# Patient Record
Sex: FEMALE | Race: OTHER | Employment: FULL TIME | URBAN - METROPOLITAN AREA
[De-identification: names, ages, dates, MRNs, and addresses within clinical notes are randomized per-mention and may not be internally consistent; named-entity substitution may affect disease eponyms.]

---

## 2017-03-03 ENCOUNTER — OFFICE VISIT (OUTPATIENT)
Dept: FAMILY MEDICINE CLINIC | Facility: CLINIC | Age: 52
End: 2017-03-03

## 2017-03-03 VITALS
WEIGHT: 148 LBS | BODY MASS INDEX: 27.94 KG/M2 | DIASTOLIC BLOOD PRESSURE: 84 MMHG | HEART RATE: 72 BPM | HEIGHT: 61 IN | TEMPERATURE: 98 F | SYSTOLIC BLOOD PRESSURE: 155 MMHG

## 2017-03-03 DIAGNOSIS — S29.012A RHOMBOID MUSCLE STRAIN, INITIAL ENCOUNTER: Primary | ICD-10-CM

## 2017-03-03 DIAGNOSIS — M62.830 BACK MUSCLE SPASM: ICD-10-CM

## 2017-03-03 DIAGNOSIS — I10 ESSENTIAL HYPERTENSION: ICD-10-CM

## 2017-03-03 DIAGNOSIS — M79.602 PAIN IN LEFT ARM: ICD-10-CM

## 2017-03-03 PROCEDURE — 99214 OFFICE O/P EST MOD 30 MIN: CPT | Performed by: FAMILY MEDICINE

## 2017-03-03 PROCEDURE — 99212 OFFICE O/P EST SF 10 MIN: CPT | Performed by: FAMILY MEDICINE

## 2017-03-03 RX ORDER — DICLOFENAC SODIUM 75 MG/1
75 TABLET, DELAYED RELEASE ORAL 2 TIMES DAILY
Qty: 42 TABLET | Refills: 0 | Status: SHIPPED | OUTPATIENT
Start: 2017-03-03 | End: 2017-03-24

## 2017-03-03 RX ORDER — LISINOPRIL 10 MG/1
10 TABLET ORAL
Qty: 90 TABLET | Refills: 1 | Status: SHIPPED | OUTPATIENT
Start: 2017-03-03 | End: 2017-07-28 | Stop reason: DRUGHIGH

## 2017-03-03 RX ORDER — CYCLOBENZAPRINE HCL 10 MG
10 TABLET ORAL NIGHTLY
Qty: 20 TABLET | Refills: 0 | Status: SHIPPED | OUTPATIENT
Start: 2017-03-03 | End: 2017-03-23

## 2017-03-03 NOTE — PROGRESS NOTES
Patient ID: Carole Johnston is a 46year old female. HPI  Patient presents with:  Shoulder Pain: left     She works at [employer] and has to scan quite a bit of boxes.   She states she is right-hand dominant for 3 weeks now she has been having pain by her left sca reflexes. No cranial nerve deficit or sensory deficit. Coordination and gait normal.   Skin: Skin is warm and dry. Psychiatric: Patient has a normal mood and affect. Vitals reviewed.     Empty can testing for supraspinatus testing of the rotator cuff is discussed and patient/family member understands and agrees to plan. Return in about 1 month (around 4/3/2017).         Torie Schwartz,   3/3/2017

## 2017-03-14 ENCOUNTER — OFFICE VISIT (OUTPATIENT)
Dept: FAMILY MEDICINE CLINIC | Facility: CLINIC | Age: 52
End: 2017-03-14

## 2017-03-14 VITALS
TEMPERATURE: 101 F | HEART RATE: 85 BPM | DIASTOLIC BLOOD PRESSURE: 78 MMHG | SYSTOLIC BLOOD PRESSURE: 148 MMHG | BODY MASS INDEX: 27.94 KG/M2 | HEIGHT: 61 IN | WEIGHT: 148 LBS

## 2017-03-14 DIAGNOSIS — M79.10 MYALGIA: ICD-10-CM

## 2017-03-14 DIAGNOSIS — R50.9 FEVER, UNSPECIFIED FEVER CAUSE: ICD-10-CM

## 2017-03-14 DIAGNOSIS — H10.9 CONJUNCTIVITIS OF BOTH EYES, UNSPECIFIED CONJUNCTIVITIS TYPE: Primary | ICD-10-CM

## 2017-03-14 PROCEDURE — 99214 OFFICE O/P EST MOD 30 MIN: CPT | Performed by: FAMILY MEDICINE

## 2017-03-14 PROCEDURE — 99212 OFFICE O/P EST SF 10 MIN: CPT | Performed by: FAMILY MEDICINE

## 2017-03-14 RX ORDER — AZELASTINE HYDROCHLORIDE 0.5 MG/ML
1 SOLUTION/ DROPS OPHTHALMIC 2 TIMES DAILY
Qty: 1 BOTTLE | Refills: 0 | Status: SHIPPED | OUTPATIENT
Start: 2017-03-14 | End: 2017-07-12

## 2017-03-14 RX ORDER — OSELTAMIVIR PHOSPHATE 75 MG/1
75 CAPSULE ORAL 2 TIMES DAILY
Qty: 10 CAPSULE | Refills: 0 | Status: SHIPPED | OUTPATIENT
Start: 2017-03-14 | End: 2017-03-19

## 2017-03-14 RX ORDER — IBUPROFEN 600 MG/1
600 TABLET ORAL EVERY 8 HOURS PRN
Qty: 30 TABLET | Refills: 0 | Status: SHIPPED | OUTPATIENT
Start: 2017-03-14 | End: 2017-03-24

## 2017-03-14 NOTE — PROGRESS NOTES
Patient ID: Carmen Lozoya is a 46year old female.     HPI  Patient presents with:  Eye Problem: redness, itchy   Fatigue    She states yesterday morning her left eye felt a little crusty but this morning was the right eye as well as left eye and she awoke w rhinorrhea. THROAT: Oropharynx is clear without exudate   Eyes: Extraocular muscles are intact. Conjunctiva are slightly red. Eyelids are slightly puffy anteriorly but no purulent discharge. Neck: Normal range of motion. No thyromegaly present.    Card DO  3/14/2017

## 2017-03-16 ENCOUNTER — TELEPHONE (OUTPATIENT)
Dept: FAMILY MEDICINE CLINIC | Facility: CLINIC | Age: 52
End: 2017-03-16

## 2017-03-16 NOTE — TELEPHONE ENCOUNTER
----- Message from Diana Mota DO sent at 3/15/2017  6:09 PM CDT -----  Nasal swab was completely negative. Hopefully you are feeling better. If you are you can really stop the Tamiflu as the influenza test was negative.

## 2017-04-03 ENCOUNTER — OFFICE VISIT (OUTPATIENT)
Dept: FAMILY MEDICINE CLINIC | Facility: CLINIC | Age: 52
End: 2017-04-03

## 2017-04-03 VITALS
SYSTOLIC BLOOD PRESSURE: 142 MMHG | WEIGHT: 148 LBS | BODY MASS INDEX: 28 KG/M2 | TEMPERATURE: 99 F | DIASTOLIC BLOOD PRESSURE: 84 MMHG | HEART RATE: 76 BPM

## 2017-04-03 DIAGNOSIS — J01.80 OTHER ACUTE SINUSITIS, RECURRENCE NOT SPECIFIED: Primary | ICD-10-CM

## 2017-04-03 PROCEDURE — 99213 OFFICE O/P EST LOW 20 MIN: CPT | Performed by: FAMILY MEDICINE

## 2017-04-03 PROCEDURE — 99212 OFFICE O/P EST SF 10 MIN: CPT | Performed by: FAMILY MEDICINE

## 2017-04-03 RX ORDER — AMOXICILLIN 875 MG/1
875 TABLET, COATED ORAL 2 TIMES DAILY
Qty: 20 TABLET | Refills: 0 | Status: SHIPPED | OUTPATIENT
Start: 2017-04-03 | End: 2017-04-13

## 2017-04-03 RX ORDER — FLUTICASONE PROPIONATE 50 MCG
2 SPRAY, SUSPENSION (ML) NASAL NIGHTLY
Qty: 1 BOTTLE | Refills: 1 | Status: SHIPPED | OUTPATIENT
Start: 2017-04-03 | End: 2017-07-28

## 2017-04-03 NOTE — PROGRESS NOTES
HPI:   Raynell Gaucher is a 46year old female who presents for upper respiratory symptoms for  2  weeks. Patient reports congestion, cough with yellow colored sputum, cough is keeping pt up at night.     Seen 3/14 and presumed flu and prescribed tamiflu  Flu t apparent distress  SKIN: no rashes,no suspicious lesions  EYES:PERRLA, EOMI, conjunctiva are clear  HEENT: atraumatic, normocephalic,ears and throat are clear  NECK: supple,no adenopathy,no bruits  LUNGS: clear to auscultation  CARDIO: RRR without murmur

## 2017-07-28 ENCOUNTER — OFFICE VISIT (OUTPATIENT)
Dept: FAMILY MEDICINE CLINIC | Facility: CLINIC | Age: 52
End: 2017-07-28

## 2017-07-28 VITALS
SYSTOLIC BLOOD PRESSURE: 148 MMHG | BODY MASS INDEX: 27.38 KG/M2 | DIASTOLIC BLOOD PRESSURE: 82 MMHG | TEMPERATURE: 99 F | HEIGHT: 61 IN | WEIGHT: 145 LBS | HEART RATE: 74 BPM

## 2017-07-28 DIAGNOSIS — I10 ESSENTIAL HYPERTENSION: ICD-10-CM

## 2017-07-28 DIAGNOSIS — J30.2 ACUTE SEASONAL ALLERGIC RHINITIS, UNSPECIFIED TRIGGER: ICD-10-CM

## 2017-07-28 DIAGNOSIS — Z00.00 WELL ADULT EXAM: Primary | ICD-10-CM

## 2017-07-28 DIAGNOSIS — Z12.31 SCREENING MAMMOGRAM, ENCOUNTER FOR: ICD-10-CM

## 2017-07-28 PROCEDURE — 99396 PREV VISIT EST AGE 40-64: CPT | Performed by: NURSE PRACTITIONER

## 2017-07-28 RX ORDER — LISINOPRIL AND HYDROCHLOROTHIAZIDE 20; 12.5 MG/1; MG/1
1 TABLET ORAL DAILY
Qty: 90 TABLET | Refills: 1 | Status: SHIPPED | OUTPATIENT
Start: 2017-07-28 | End: 2017-08-28

## 2017-07-28 RX ORDER — FLUTICASONE PROPIONATE 50 MCG
2 SPRAY, SUSPENSION (ML) NASAL DAILY
Qty: 3 BOTTLE | Refills: 3 | Status: SHIPPED | OUTPATIENT
Start: 2017-07-28 | End: 2018-07-23

## 2017-07-28 NOTE — PROGRESS NOTES
Pt presents for f/u on bp-has been elevated when checking at home 140-150's/80-90's; uses wrist cuff. Has had cold s/s for past couple of days. Thought she was in menopause but got period last month after 10 months of no period-feels a little on edge. inhibitors. The current treatment provides mild improvement. There are no compliance problems. Review of Systems   Constitutional: Positive for malaise/fatigue. Negative for fever.    HENT: Positive for congestion, rhinorrhea, sinus pain, sinus pre on file     Other Topics Concern    Caffeine Concern Yes    Comment: Coffee, 1 cup daily     Social History Narrative   None on file         Current Outpatient Prescriptions:  Fluticasone Propionate 50 MCG/ACT Nasal Suspension 2 sprays by Each Nare route d 4. Screening mammogram, encounter for        Plan:     1. Routine labs ordered  2. Stop lisinopril 10mg ; start lisinopril hctz 20/12.5 I po q d  bp check in 1 week  3. otc antihistamine, flonase  Supportive care discussed  4.  Screening mammogram ordered

## 2017-08-12 ENCOUNTER — APPOINTMENT (OUTPATIENT)
Dept: LAB | Age: 52
End: 2017-08-12
Attending: NURSE PRACTITIONER
Payer: COMMERCIAL

## 2017-08-12 DIAGNOSIS — I10 ESSENTIAL HYPERTENSION: ICD-10-CM

## 2017-08-12 DIAGNOSIS — Z00.00 WELL ADULT EXAM: ICD-10-CM

## 2017-08-12 LAB
ALBUMIN SERPL BCP-MCNC: 3.9 G/DL (ref 3.5–4.8)
ALBUMIN/GLOB SERPL: 1.2 {RATIO} (ref 1–2)
ALP SERPL-CCNC: 69 U/L (ref 32–100)
ALT SERPL-CCNC: 37 U/L (ref 14–54)
ANION GAP SERPL CALC-SCNC: 7 MMOL/L (ref 0–18)
AST SERPL-CCNC: 36 U/L (ref 15–41)
BILIRUB SERPL-MCNC: 0.7 MG/DL (ref 0.3–1.2)
BILIRUB UR QL: NEGATIVE
BUN SERPL-MCNC: 8 MG/DL (ref 8–20)
BUN/CREAT SERPL: 11.4 (ref 10–20)
CALCIUM SERPL-MCNC: 9.5 MG/DL (ref 8.5–10.5)
CHLORIDE SERPL-SCNC: 105 MMOL/L (ref 95–110)
CHOLEST SERPL-MCNC: 245 MG/DL (ref 110–200)
CO2 SERPL-SCNC: 29 MMOL/L (ref 22–32)
COLOR UR: YELLOW
CREAT SERPL-MCNC: 0.7 MG/DL (ref 0.5–1.5)
ERYTHROCYTE [DISTWIDTH] IN BLOOD BY AUTOMATED COUNT: 13.2 % (ref 11–15)
GLOBULIN PLAS-MCNC: 3.3 G/DL (ref 2.5–3.7)
GLUCOSE SERPL-MCNC: 93 MG/DL (ref 70–99)
GLUCOSE UR-MCNC: NEGATIVE MG/DL
HCT VFR BLD AUTO: 40.9 % (ref 35–48)
HDLC SERPL-MCNC: 52 MG/DL
HGB BLD-MCNC: 13.9 G/DL (ref 12–16)
HGB UR QL STRIP.AUTO: NEGATIVE
HYALINE CASTS #/AREA URNS AUTO: 1 /LPF
KETONES UR-MCNC: NEGATIVE MG/DL
LDLC SERPL CALC-MCNC: 161 MG/DL (ref 0–99)
MCH RBC QN AUTO: 31.1 PG (ref 27–32)
MCHC RBC AUTO-ENTMCNC: 34.1 G/DL (ref 32–37)
MCV RBC AUTO: 91.4 FL (ref 80–100)
NITRITE UR QL STRIP.AUTO: NEGATIVE
NONHDLC SERPL-MCNC: 193 MG/DL
OSMOLALITY UR CALC.SUM OF ELEC: 290 MOSM/KG (ref 275–295)
PH UR: 7 [PH] (ref 5–8)
PLATELET # BLD AUTO: 324 K/UL (ref 140–400)
PMV BLD AUTO: 9 FL (ref 7.4–10.3)
POTASSIUM SERPL-SCNC: 4.4 MMOL/L (ref 3.3–5.1)
PROT SERPL-MCNC: 7.2 G/DL (ref 5.9–8.4)
PROT UR-MCNC: NEGATIVE MG/DL
RBC # BLD AUTO: 4.47 M/UL (ref 3.7–5.4)
RBC #/AREA URNS AUTO: 1 /HPF
SODIUM SERPL-SCNC: 141 MMOL/L (ref 136–144)
SP GR UR STRIP: 1.01 (ref 1–1.03)
TRIGL SERPL-MCNC: 161 MG/DL (ref 1–149)
TSH SERPL-ACNC: 2.16 UIU/ML (ref 0.45–5.33)
UROBILINOGEN UR STRIP-ACNC: <2
VIT B12 SERPL-MCNC: 670 PG/ML (ref 181–914)
VIT C UR-MCNC: NEGATIVE MG/DL
WBC # BLD AUTO: 7.4 K/UL (ref 4–11)
WBC #/AREA URNS AUTO: 1 /HPF

## 2017-08-12 PROCEDURE — 81001 URINALYSIS AUTO W/SCOPE: CPT

## 2017-08-12 PROCEDURE — 80053 COMPREHEN METABOLIC PANEL: CPT

## 2017-08-12 PROCEDURE — 84443 ASSAY THYROID STIM HORMONE: CPT

## 2017-08-12 PROCEDURE — 82607 VITAMIN B-12: CPT

## 2017-08-12 PROCEDURE — 80061 LIPID PANEL: CPT

## 2017-08-12 PROCEDURE — 82306 VITAMIN D 25 HYDROXY: CPT

## 2017-08-12 PROCEDURE — 36415 COLL VENOUS BLD VENIPUNCTURE: CPT

## 2017-08-12 PROCEDURE — 83036 HEMOGLOBIN GLYCOSYLATED A1C: CPT

## 2017-08-12 PROCEDURE — 85027 COMPLETE CBC AUTOMATED: CPT

## 2017-08-13 LAB — HBA1C MFR BLD: 5.8 % (ref 4–6)

## 2017-08-14 DIAGNOSIS — E78.2 MIXED HYPERLIPIDEMIA: Primary | ICD-10-CM

## 2017-08-14 LAB — 25(OH)D3 SERPL-MCNC: 32.5 NG/ML

## 2017-08-14 RX ORDER — ATORVASTATIN CALCIUM 10 MG/1
10 TABLET, FILM COATED ORAL DAILY
Qty: 90 TABLET | Refills: 3 | Status: SHIPPED | OUTPATIENT
Start: 2017-08-14 | End: 2017-10-24

## 2017-08-18 ENCOUNTER — TELEPHONE (OUTPATIENT)
Dept: FAMILY MEDICINE CLINIC | Facility: CLINIC | Age: 52
End: 2017-08-18

## 2017-08-18 NOTE — TELEPHONE ENCOUNTER
----- Message from KATTY Oreilly FNP-C sent at 8/14/2017  9:26 PM CDT -----  Your lab results are essentially normal except for your cholesterol and triglycerides.   You will need to start taking a cholesterol and triglyceride lowering medication

## 2017-08-19 NOTE — TELEPHONE ENCOUNTER
Spoke with patient (identified name and ), results reviewed and agrees with plan.   Mailed chol diet

## 2017-08-28 ENCOUNTER — OFFICE VISIT (OUTPATIENT)
Dept: FAMILY MEDICINE CLINIC | Facility: CLINIC | Age: 52
End: 2017-08-28

## 2017-08-28 VITALS
SYSTOLIC BLOOD PRESSURE: 121 MMHG | HEIGHT: 61 IN | TEMPERATURE: 98 F | BODY MASS INDEX: 27.56 KG/M2 | WEIGHT: 146 LBS | HEART RATE: 60 BPM | DIASTOLIC BLOOD PRESSURE: 73 MMHG

## 2017-08-28 DIAGNOSIS — E78.00 HYPERCHOLESTEREMIA: ICD-10-CM

## 2017-08-28 DIAGNOSIS — I10 ESSENTIAL HYPERTENSION: Primary | ICD-10-CM

## 2017-08-28 PROCEDURE — 99214 OFFICE O/P EST MOD 30 MIN: CPT | Performed by: FAMILY MEDICINE

## 2017-08-28 PROCEDURE — 99212 OFFICE O/P EST SF 10 MIN: CPT | Performed by: FAMILY MEDICINE

## 2017-08-28 RX ORDER — LISINOPRIL 10 MG/1
10 TABLET ORAL
Qty: 90 TABLET | Refills: 1 | Status: SHIPPED | OUTPATIENT
Start: 2017-08-28 | End: 2017-10-04

## 2017-08-28 NOTE — PATIENT INSTRUCTIONS
Lifestyle Changes to Control Cholesterol  You can control your cholesterol through diet, exercise, weight management, quitting smoking, stress management, and taking your medicines right. These things can also lower your risk for cardiovascular disease. · Riding a bicycle or stationary bike  · Dancing  Managing your weight  If you are overweight or obese, your healthcare provider will work with you to help you lose weight and lower your BMI (body mass index).  Making diet changes and getting more physical · Don’t skip a dose or stop taking your medicine because you feel better or because your cholesterol numbers go down. Never stop taking your medicine unless your healthcare provider has told you it’s OK.   · Ask your healthcare provider if you have any ques © 4152-8461 [address], [address] [place]. All rights reserved. This information is not intended as a substitute for professional medical care. Always follow your healthcare professional's instructions.

## 2017-08-28 NOTE — PROGRESS NOTES
Gregorio Velázquez is a 46year old female. HPI:   Patient presents for recheck of her hypertension. Pt has been taking medications as instructed, no medication side effects    Patient just had a physical recently with our NP.     bp was high that day and so b Diagnosis Date   • Anxiety state, unspecified    • Screening for lipoid disorders 3/7/2015    lipid panel   • Unspecified essential hypertension       Past Surgical History:  No date:   No date:       Comment: 4   Social History:    Smoking

## 2017-08-30 ENCOUNTER — TELEPHONE (OUTPATIENT)
Dept: FAMILY MEDICINE CLINIC | Facility: CLINIC | Age: 52
End: 2017-08-30

## 2017-08-30 NOTE — TELEPHONE ENCOUNTER
Reviewed 8/28/17 progress note-   ASSESSMENT AND PLAN:   Pt presents for a recheck of her hypertension. BP is well controlled, no significant medication side effects noted. PLAN: will continue present medications, low fat low chol diet.  The patient indica

## 2017-08-30 NOTE — TELEPHONE ENCOUNTER
Per pharmacy received lisinopril 10 MG Oral Tab and pt has prescription lisinopril 20/12.5 MG Oral Tab from [facility]ulo last 07/28/2017 would like to know which one to issue.

## 2017-09-22 DIAGNOSIS — I10 ESSENTIAL HYPERTENSION: ICD-10-CM

## 2017-09-26 RX ORDER — LISINOPRIL 10 MG/1
TABLET ORAL
Qty: 90 TABLET | Refills: 0 | Status: SHIPPED | OUTPATIENT
Start: 2017-09-26 | End: 2017-10-04

## 2017-10-04 ENCOUNTER — OFFICE VISIT (OUTPATIENT)
Dept: FAMILY MEDICINE CLINIC | Facility: CLINIC | Age: 52
End: 2017-10-04

## 2017-10-04 VITALS
HEIGHT: 61 IN | TEMPERATURE: 98 F | BODY MASS INDEX: 27.75 KG/M2 | WEIGHT: 147 LBS | DIASTOLIC BLOOD PRESSURE: 74 MMHG | HEART RATE: 58 BPM | SYSTOLIC BLOOD PRESSURE: 138 MMHG

## 2017-10-04 DIAGNOSIS — N95.1 PERIMENOPAUSAL: Primary | ICD-10-CM

## 2017-10-04 DIAGNOSIS — N92.6 IRREGULAR MENSES: ICD-10-CM

## 2017-10-04 DIAGNOSIS — Z23 NEED FOR INFLUENZA VACCINATION: ICD-10-CM

## 2017-10-04 DIAGNOSIS — R20.2 PARESTHESIAS: ICD-10-CM

## 2017-10-04 DIAGNOSIS — R53.83 FATIGUE, UNSPECIFIED TYPE: ICD-10-CM

## 2017-10-04 PROCEDURE — 90686 IIV4 VACC NO PRSV 0.5 ML IM: CPT | Performed by: FAMILY MEDICINE

## 2017-10-04 PROCEDURE — 99214 OFFICE O/P EST MOD 30 MIN: CPT | Performed by: FAMILY MEDICINE

## 2017-10-04 PROCEDURE — 99212 OFFICE O/P EST SF 10 MIN: CPT | Performed by: FAMILY MEDICINE

## 2017-10-04 PROCEDURE — 90471 IMMUNIZATION ADMIN: CPT | Performed by: FAMILY MEDICINE

## 2017-10-04 RX ORDER — LISINOPRIL AND HYDROCHLOROTHIAZIDE 20; 12.5 MG/1; MG/1
TABLET ORAL
Refills: 1 | COMMUNITY
Start: 2017-07-28 | End: 2018-02-06 | Stop reason: DRUGHIGH

## 2017-10-05 NOTE — PROGRESS NOTES
HPI:    Patient ID: Vanessa Morse is a 46year old female.     HPI     Patient presents with:  Fatigue: Pt c/o weakness, fatigue x 3 weeks  Numbness: Pt c/o numbness in foot, bilateral    Feels numbness more in left but sometimes right  Feels numbness heels She displays normal reflexes. No cranial nerve deficit. She exhibits normal muscle tone. Coordination normal.   Skin: Skin is warm and dry. No rash noted.               ASSESSMENT/PLAN:   Perimenopausal  (primary encounter diagnosis)  Irregular menses  Fati

## 2017-10-06 ENCOUNTER — LAB ENCOUNTER (OUTPATIENT)
Dept: LAB | Age: 52
End: 2017-10-06
Attending: FAMILY MEDICINE
Payer: COMMERCIAL

## 2017-10-06 DIAGNOSIS — N92.6 IRREGULAR MENSES: ICD-10-CM

## 2017-10-06 DIAGNOSIS — R20.2 PARESTHESIAS: ICD-10-CM

## 2017-10-06 DIAGNOSIS — R53.83 FATIGUE, UNSPECIFIED TYPE: ICD-10-CM

## 2017-10-06 PROCEDURE — 83001 ASSAY OF GONADOTROPIN (FSH): CPT

## 2017-10-06 PROCEDURE — 80053 COMPREHEN METABOLIC PANEL: CPT

## 2017-10-06 PROCEDURE — 82670 ASSAY OF TOTAL ESTRADIOL: CPT

## 2017-10-06 PROCEDURE — 36415 COLL VENOUS BLD VENIPUNCTURE: CPT

## 2017-10-06 PROCEDURE — 85025 COMPLETE CBC W/AUTO DIFF WBC: CPT

## 2017-10-06 PROCEDURE — 83002 ASSAY OF GONADOTROPIN (LH): CPT

## 2017-10-10 ENCOUNTER — TELEPHONE (OUTPATIENT)
Dept: FAMILY MEDICINE CLINIC | Facility: CLINIC | Age: 52
End: 2017-10-10

## 2017-10-10 NOTE — TELEPHONE ENCOUNTER
----- Message from Fish Romero RN sent at 10/10/2017  8:32 AM CDT -----      ----- Message -----  From: Matti Caceres RN  Sent: 10/10/2017   8:28 AM  To: Destiney Whitmore Triage        ----- Message -----  From: Debbie Valdez MD  Sent: 10/9/2017   9:28 PM  To:  Destiney

## 2017-10-10 NOTE — TELEPHONE ENCOUNTER
Patient called back and advised about Dr Zay Choi note. Patient will check with her insurance about the U/S coverage. U/S already generated. Patient verbalized understanding.

## 2017-10-24 ENCOUNTER — OFFICE VISIT (OUTPATIENT)
Dept: FAMILY MEDICINE CLINIC | Facility: CLINIC | Age: 52
End: 2017-10-24

## 2017-10-24 VITALS
HEIGHT: 61 IN | HEART RATE: 60 BPM | TEMPERATURE: 98 F | WEIGHT: 145 LBS | DIASTOLIC BLOOD PRESSURE: 73 MMHG | BODY MASS INDEX: 27.38 KG/M2 | SYSTOLIC BLOOD PRESSURE: 133 MMHG

## 2017-10-24 DIAGNOSIS — R53.1 WEAKNESS GENERALIZED: ICD-10-CM

## 2017-10-24 DIAGNOSIS — F41.9 ANXIETY AND DEPRESSION: ICD-10-CM

## 2017-10-24 DIAGNOSIS — R42 DIZZINESS: Primary | ICD-10-CM

## 2017-10-24 DIAGNOSIS — R51.9 LEFT TEMPORAL HEADACHE: ICD-10-CM

## 2017-10-24 DIAGNOSIS — F32.A ANXIETY AND DEPRESSION: ICD-10-CM

## 2017-10-24 PROCEDURE — 99214 OFFICE O/P EST MOD 30 MIN: CPT | Performed by: FAMILY MEDICINE

## 2017-10-24 PROCEDURE — 99212 OFFICE O/P EST SF 10 MIN: CPT | Performed by: FAMILY MEDICINE

## 2017-10-24 RX ORDER — ESCITALOPRAM OXALATE 10 MG/1
10 TABLET ORAL
Qty: 90 TABLET | Refills: 0 | Status: SHIPPED | OUTPATIENT
Start: 2017-10-24 | End: 2018-02-06

## 2017-10-24 RX ORDER — ATORVASTATIN CALCIUM 20 MG/1
20 TABLET, FILM COATED ORAL NIGHTLY
Refills: 0 | COMMUNITY
Start: 2017-10-21 | End: 2017-12-22

## 2017-10-24 NOTE — PROGRESS NOTES
HPI:    Patient ID: Raynell Gaucher is a 46year old female. HPI     Patient here for follow up hospitalization  Alesha Silverman to [facility] for her sons graduation, on 10/18/17    Felt very anxious on the plane. Calmed herself down    Then got off plane.  Went to eat Prescriptions:  atorvastatin 20 MG Oral Tab  Disp:  Rfl: 0   escitalopram 10 MG Oral Tab Take 1 tablet (10 mg total) by mouth once daily.  Disp: 90 tablet Rfl: 0   Lisinopril-Hydrochlorothiazide 20-12.5 MG Oral Tab TK 1 T PO D Disp:  Rfl: 1   aspirin 81 MG daily.           Imaging & Referrals:  NEURO - INTERNAL       QG#[id number]

## 2017-10-25 ENCOUNTER — OFFICE VISIT (OUTPATIENT)
Dept: NEUROLOGY | Facility: CLINIC | Age: 52
End: 2017-10-25

## 2017-10-25 ENCOUNTER — TELEPHONE (OUTPATIENT)
Dept: NEUROLOGY | Facility: CLINIC | Age: 52
End: 2017-10-25

## 2017-10-25 VITALS
DIASTOLIC BLOOD PRESSURE: 70 MMHG | WEIGHT: 146 LBS | BODY MASS INDEX: 27.56 KG/M2 | SYSTOLIC BLOOD PRESSURE: 122 MMHG | HEIGHT: 61 IN | HEART RATE: 54 BPM | RESPIRATION RATE: 13 BRPM

## 2017-10-25 DIAGNOSIS — R55 SYNCOPE, UNSPECIFIED SYNCOPE TYPE: Primary | ICD-10-CM

## 2017-10-25 DIAGNOSIS — G44.209 TENSION-TYPE HEADACHE, NOT INTRACTABLE, UNSPECIFIED CHRONICITY PATTERN: ICD-10-CM

## 2017-10-25 PROCEDURE — 99204 OFFICE O/P NEW MOD 45 MIN: CPT | Performed by: OTHER

## 2017-10-25 NOTE — PROGRESS NOTES
Neurology Initial Visit     Referred By: Dr. Morse ref. provider found    Chief Complaint: Patient presents with:  Neurologic Problem: new right handed patient here after episode of passing out on 10/18/17.  pt had lightheadedness, sob, sweating, nausea, vis disorders 3/7/2015    lipid panel   • Unspecified essential hypertension        Past Surgical History:  No date:   No date:       Comment: 4    Social history:    Smoking status: Never Smoker   Smokeless tobacco: Never Used       Alcohol use N confrontation        Fundoscopically- No papilledema or retinal hemorrhages. Normal optic discs, sharp edges. III, IV, VI- EOM intact, MARÍA  V. Facial sensation intact  VII. Face symmetric, no facial weakness  VIII.  Hearing intact to whisper, tuning fork prevention. 2. Tension-type headache, not intractable, unspecified chronicity pattern  New onset headache. It is relatively mild.   So far is not exhibiting any signs of structural cause, MRI of the brain was already done and that was normal.  Therefore

## 2017-10-25 NOTE — TELEPHONE ENCOUNTER
Called [facility] for authorization of approval of acupuncture cpt codes [id number]. Talked to [address]. who states no authorization is required. Reference #   [id number]. Will call Pt. to inform. Pt. informed no authorization is required.  She  will [facility]

## 2017-11-04 ENCOUNTER — HOSPITAL ENCOUNTER (OUTPATIENT)
Dept: ELECTROPHYSIOLOGY | Facility: HOSPITAL | Age: 52
Discharge: HOME | End: 2017-11-04
Attending: Other
Payer: COMMERCIAL

## 2017-11-04 DIAGNOSIS — R55 SYNCOPE, UNSPECIFIED SYNCOPE TYPE: ICD-10-CM

## 2017-11-04 PROCEDURE — 95816 EEG AWAKE AND DROWSY: CPT | Performed by: OTHER

## 2017-11-06 NOTE — PROCEDURES
[address]  [facility], [address]      PATIENT'S NAME: Yudith Arthur   ATTENDING PHYSICIAN: Jeremy Garcia MD   [facility]: [facility]de-identified[facility]   MEDICAL RECORD #: [id number] DATE OF BIRTH: [birth year]

## 2017-11-06 NOTE — ADDENDUM NOTE
Encounter addended by: Neena Coates MD on: 11/6/2017  8:33 AM<BR>    Actions taken: Visit diagnoses modified, Charge Capture section accepted

## 2017-11-14 ENCOUNTER — TELEPHONE (OUTPATIENT)
Dept: NEUROLOGY | Facility: CLINIC | Age: 52
End: 2017-11-14

## 2017-11-15 NOTE — TELEPHONE ENCOUNTER
Left detailed message ok per hipaa to notify pt EEG results are normal.    MD Vasquez Ghosh MA   Caller: Unspecified Theresa Chongum,  2:57 PM)             Please let her know it was normal.

## 2017-12-16 ENCOUNTER — APPOINTMENT (OUTPATIENT)
Dept: LAB | Age: 52
End: 2017-12-16
Attending: INTERNAL MEDICINE
Payer: COMMERCIAL

## 2017-12-22 ENCOUNTER — TELEPHONE (OUTPATIENT)
Dept: OTHER | Age: 52
End: 2017-12-22

## 2017-12-22 RX ORDER — ATORVASTATIN CALCIUM 20 MG/1
20 TABLET, FILM COATED ORAL NIGHTLY
Qty: 90 TABLET | Refills: 0 | Status: SHIPPED | OUTPATIENT
Start: 2017-12-22 | End: 2018-02-06

## 2017-12-22 NOTE — TELEPHONE ENCOUNTER
Signed Prescriptions Disp Refills    atorvastatin 20 MG Oral Tab 90 tablet 0      Sig: Take 1 tablet (20 mg total) by mouth nightly. Authorizing Provider: Renate Lubin        Ordering User: Carolann Shine           Refill approved per protocol.

## 2017-12-30 ENCOUNTER — LAB ENCOUNTER (OUTPATIENT)
Dept: LAB | Age: 52
End: 2017-12-30
Attending: INTERNAL MEDICINE
Payer: COMMERCIAL

## 2017-12-30 DIAGNOSIS — E78.2 MIXED HYPERLIPIDEMIA: Primary | ICD-10-CM

## 2017-12-30 DIAGNOSIS — I49.3 VENTRICULAR PREMATURE BEATS: ICD-10-CM

## 2017-12-30 LAB
ALT SERPL-CCNC: 35 U/L (ref 14–54)
ANION GAP SERPL CALC-SCNC: 7 MMOL/L (ref 0–18)
AST SERPL-CCNC: 30 U/L (ref 15–41)
BUN SERPL-MCNC: 12 MG/DL (ref 8–20)
BUN/CREAT SERPL: 16.2 (ref 10–20)
CALCIUM SERPL-MCNC: 9.5 MG/DL (ref 8.5–10.5)
CHLORIDE SERPL-SCNC: 103 MMOL/L (ref 95–110)
CHOLEST SERPL-MCNC: 156 MG/DL (ref 110–200)
CK SERPL-CCNC: 182 U/L (ref 38–234)
CO2 SERPL-SCNC: 29 MMOL/L (ref 22–32)
CREAT SERPL-MCNC: 0.74 MG/DL (ref 0.5–1.5)
GLUCOSE SERPL-MCNC: 91 MG/DL (ref 70–99)
HDLC SERPL-MCNC: 61 MG/DL
LDLC SERPL CALC-MCNC: 69 MG/DL (ref 0–99)
NONHDLC SERPL-MCNC: 95 MG/DL
OSMOLALITY UR CALC.SUM OF ELEC: 287 MOSM/KG (ref 275–295)
POTASSIUM SERPL-SCNC: 3.9 MMOL/L (ref 3.3–5.1)
SODIUM SERPL-SCNC: 139 MMOL/L (ref 136–144)
TRIGL SERPL-MCNC: 132 MG/DL (ref 1–149)
TSH SERPL-ACNC: 1.3 UIU/ML (ref 0.45–5.33)

## 2017-12-30 PROCEDURE — 84460 ALANINE AMINO (ALT) (SGPT): CPT

## 2017-12-30 PROCEDURE — 80061 LIPID PANEL: CPT

## 2017-12-30 PROCEDURE — 84443 ASSAY THYROID STIM HORMONE: CPT

## 2017-12-30 PROCEDURE — 84450 TRANSFERASE (AST) (SGOT): CPT

## 2017-12-30 PROCEDURE — 36415 COLL VENOUS BLD VENIPUNCTURE: CPT

## 2017-12-30 PROCEDURE — 82550 ASSAY OF CK (CPK): CPT

## 2017-12-30 PROCEDURE — 80048 BASIC METABOLIC PNL TOTAL CA: CPT

## 2018-02-06 ENCOUNTER — OFFICE VISIT (OUTPATIENT)
Dept: FAMILY MEDICINE CLINIC | Facility: CLINIC | Age: 53
End: 2018-02-06

## 2018-02-06 VITALS
DIASTOLIC BLOOD PRESSURE: 77 MMHG | HEIGHT: 61 IN | SYSTOLIC BLOOD PRESSURE: 155 MMHG | TEMPERATURE: 98 F | WEIGHT: 150 LBS | HEART RATE: 69 BPM | BODY MASS INDEX: 28.32 KG/M2

## 2018-02-06 DIAGNOSIS — Z87.42 HISTORY OF ABNORMAL CERVICAL PAP SMEAR: ICD-10-CM

## 2018-02-06 DIAGNOSIS — I07.1 TRICUSPID VALVE INSUFFICIENCY, UNSPECIFIED ETIOLOGY: ICD-10-CM

## 2018-02-06 DIAGNOSIS — E78.00 HYPERCHOLESTEREMIA: ICD-10-CM

## 2018-02-06 DIAGNOSIS — I34.0 MITRAL VALVE INSUFFICIENCY, UNSPECIFIED ETIOLOGY: ICD-10-CM

## 2018-02-06 DIAGNOSIS — Z01.419 ENCOUNTER FOR GYNECOLOGICAL EXAMINATION: ICD-10-CM

## 2018-02-06 DIAGNOSIS — F41.9 ANXIETY AND DEPRESSION: ICD-10-CM

## 2018-02-06 DIAGNOSIS — I10 ESSENTIAL HYPERTENSION: ICD-10-CM

## 2018-02-06 DIAGNOSIS — Z11.3 SCREENING EXAMINATION FOR STD (SEXUALLY TRANSMITTED DISEASE): ICD-10-CM

## 2018-02-06 DIAGNOSIS — Z00.00 WELL ADULT EXAM: Primary | ICD-10-CM

## 2018-02-06 DIAGNOSIS — F32.A ANXIETY AND DEPRESSION: ICD-10-CM

## 2018-02-06 PROBLEM — R53.1 WEAKNESS GENERALIZED: Status: RESOLVED | Noted: 2017-10-24 | Resolved: 2018-02-06

## 2018-02-06 PROCEDURE — 99396 PREV VISIT EST AGE 40-64: CPT | Performed by: FAMILY MEDICINE

## 2018-02-06 RX ORDER — ATORVASTATIN CALCIUM 20 MG/1
20 TABLET, FILM COATED ORAL NIGHTLY
Qty: 90 TABLET | Refills: 3 | Status: SHIPPED | OUTPATIENT
Start: 2018-02-06 | End: 2018-11-30

## 2018-02-06 RX ORDER — ESCITALOPRAM OXALATE 10 MG/1
10 TABLET ORAL
Qty: 90 TABLET | Refills: 3 | Status: SHIPPED | OUTPATIENT
Start: 2018-02-06 | End: 2019-10-24 | Stop reason: ALTCHOICE

## 2018-02-06 RX ORDER — LISINOPRIL AND HYDROCHLOROTHIAZIDE 25; 20 MG/1; MG/1
1 TABLET ORAL EVERY MORNING
Qty: 90 TABLET | Refills: 0 | Status: SHIPPED | OUTPATIENT
Start: 2018-02-06 | End: 2018-02-08

## 2018-02-06 NOTE — PROGRESS NOTES
HPI:   Laila Paz is a 46year old female who presents for a complete physical exam. Symptoms: no menses since june 2017.      Wt Readings from Last 6 Encounters:  02/06/18 : 150 lb (68 kg)  10/25/17 : 146 lb (66.2 kg)  10/24/17 : 145 lb (65.8 kg)  10/04/1 3/7/2015    lipid panel   • Unspecified essential hypertension       Past Surgical History:  No date:       Comment: 1  No date:       Comment: 3   Family History   Problem Relation Age of Onset   • Hypertension Mother    • Lipids Mother    • lesions  HEENT: atraumatic, normocephalic,ears and throat are clear  EYES:PERRLA, EOMI,conjunctiva are clear  NECK: supple,no adenopathy,no bruits  CHEST: no chest tenderness  BREAST: no dominant or suspicious mass  LUNGS: clear to auscultation  CARDIO: RR

## 2018-02-07 ENCOUNTER — TELEPHONE (OUTPATIENT)
Dept: FAMILY MEDICINE CLINIC | Facility: CLINIC | Age: 53
End: 2018-02-07

## 2018-02-07 DIAGNOSIS — I10 ESSENTIAL HYPERTENSION: ICD-10-CM

## 2018-02-07 LAB
C TRACH DNA SPEC QL NAA+PROBE: NEGATIVE
N GONORRHOEA DNA SPEC QL NAA+PROBE: NEGATIVE

## 2018-02-07 NOTE — TELEPHONE ENCOUNTER
Pt was seen yesterday by RAINE and states AMA had asked her who had changed her prescription from lisinopril 10 mg to lisinopril-HCTZ 20-12.5 mg. Pt states she had misunderstood the question.  Pt states she actually never took the C/Brant Garza had burt

## 2018-02-07 NOTE — TELEPHONE ENCOUNTER
Pt calling states she as in office on 02/06 and has some questions about the changed medications requesting call back form nurse or        Current Outpatient Prescriptions:  Lisinopril-Hydrochlorothiazide 20-25 MG Oral Tab Take 1 tablet by mouth every m

## 2018-02-08 LAB — HPV I/H RISK 1 DNA SPEC QL NAA+PROBE: NEGATIVE

## 2018-02-08 RX ORDER — LISINOPRIL 10 MG/1
20 TABLET ORAL
Qty: 1 TABLET | Refills: 0 | COMMUNITY
Start: 2018-02-08 | End: 2018-05-02 | Stop reason: DRUGHIGH

## 2018-02-08 NOTE — TELEPHONE ENCOUNTER
I would recommend increasing lisinopril from 10-20 mg daily. She can take 2 pills of the 10 mg tablets. She is not to take the lisinopril with HIDA at this time then.   Recommend checking a blood pressures at home and to follow-up in the next 1-2 weeks fo

## 2018-02-14 ENCOUNTER — HOSPITAL ENCOUNTER (OUTPATIENT)
Dept: MAMMOGRAPHY | Facility: HOSPITAL | Age: 53
Discharge: HOME | End: 2018-02-14
Attending: NURSE PRACTITIONER
Payer: COMMERCIAL

## 2018-02-14 DIAGNOSIS — Z12.31 SCREENING MAMMOGRAM, ENCOUNTER FOR: ICD-10-CM

## 2018-02-14 PROCEDURE — 77067 SCR MAMMO BI INCL CAD: CPT | Performed by: NURSE PRACTITIONER

## 2018-02-17 ENCOUNTER — TELEPHONE (OUTPATIENT)
Dept: OTHER | Age: 53
End: 2018-02-17

## 2018-02-17 NOTE — TELEPHONE ENCOUNTER
Spoke with patient who was requesting to know her pap and mammogram results. Patient made aware of her results and of the plan of care. Patient voiced understanding and agreed with the plan of care.            Notes Recorded by KATTY Chen FNP-

## 2018-02-19 ENCOUNTER — TELEPHONE (OUTPATIENT)
Dept: FAMILY MEDICINE CLINIC | Facility: CLINIC | Age: 53
End: 2018-02-19

## 2018-02-19 NOTE — TELEPHONE ENCOUNTER
----- Message from KATTY Butcher FNP-C sent at 2/17/2018 10:07 AM CST -----  Your mammogram showed benign findings (no areas of concern). Please con't to do monthly self breast exams and your mammogram should be repeated in one year.   Please let

## 2018-02-22 ENCOUNTER — OFFICE VISIT (OUTPATIENT)
Dept: FAMILY MEDICINE CLINIC | Facility: CLINIC | Age: 53
End: 2018-02-22

## 2018-02-22 VITALS
BODY MASS INDEX: 28.13 KG/M2 | WEIGHT: 149 LBS | TEMPERATURE: 100 F | HEART RATE: 68 BPM | HEIGHT: 61 IN | DIASTOLIC BLOOD PRESSURE: 72 MMHG | SYSTOLIC BLOOD PRESSURE: 136 MMHG

## 2018-02-22 DIAGNOSIS — R14.0 ABDOMINAL BLOATING: ICD-10-CM

## 2018-02-22 DIAGNOSIS — I10 ESSENTIAL HYPERTENSION: Primary | ICD-10-CM

## 2018-02-22 DIAGNOSIS — R10.13 ACUTE EPIGASTRIC PAIN: ICD-10-CM

## 2018-02-22 DIAGNOSIS — N92.6 IRREGULAR MENSES: ICD-10-CM

## 2018-02-22 LAB
CONTROL LINE PRESENT WITH A CLEAR BACKGROUND (YES/NO): YES YES/NO
KIT LOT #: NORMAL NUMERIC
PREGNANCY TEST, URINE: NEGATIVE

## 2018-02-22 PROCEDURE — 99214 OFFICE O/P EST MOD 30 MIN: CPT | Performed by: FAMILY MEDICINE

## 2018-02-22 PROCEDURE — 81025 URINE PREGNANCY TEST: CPT | Performed by: FAMILY MEDICINE

## 2018-02-22 PROCEDURE — 99212 OFFICE O/P EST SF 10 MIN: CPT | Performed by: FAMILY MEDICINE

## 2018-02-22 RX ORDER — RANITIDINE 150 MG/1
150 TABLET ORAL 2 TIMES DAILY
Qty: 60 TABLET | Refills: 0 | Status: SHIPPED | OUTPATIENT
Start: 2018-02-22 | End: 2019-10-24 | Stop reason: ALTCHOICE

## 2018-02-22 NOTE — PROGRESS NOTES
Vanessa Morse is a 46year old female. HPI:   Patient presents for recheck of her hypertension. Pt has been taking medications as instructed, no medication side effects, home BP monitoring in the range of 347G'P systolic and 50'N diastolic.     No menses Multiple Vitamins-Minerals (ALIVE WOMENS 50+) Oral Tab Take 1 tablet by mouth daily.  Disp:  Rfl:       Past Medical History:   Diagnosis Date   • Anxiety state, unspecified    • Bradycardia    • Hyperlipidemia    • Screening for lipoid disorders 3/7/2015

## 2018-03-07 RX ORDER — ATORVASTATIN CALCIUM 20 MG/1
TABLET, FILM COATED ORAL
Qty: 90 TABLET | Refills: 0 | OUTPATIENT
Start: 2018-03-07

## 2018-03-07 NOTE — TELEPHONE ENCOUNTER
Already filled by MD.       Pending Prescriptions Disp Refills    ATORVASTATIN 20 MG Oral Tab [Pharmacy Med Name: ATORVASTATIN 20MG   TAB] 90 tablet 0     Sig: TAKE ONE TABLET BY MOUTH NIGHTLY

## 2018-03-23 RX ORDER — ATORVASTATIN CALCIUM 20 MG/1
TABLET, FILM COATED ORAL
Qty: 90 TABLET | Refills: 0 | OUTPATIENT
Start: 2018-03-23

## 2018-03-23 NOTE — TELEPHONE ENCOUNTER
Pending Prescriptions Disp Refills    ATORVASTATIN 20 MG Oral Tab [Pharmacy Med Name: ATORVASTATIN 20MG   TAB] 90 tablet 0     Sig: TAKE ONE TABLET BY MOUTH NIGHTLY         Spoke with Boyd from [facility] & was informed last ref was [facility] for

## 2018-05-01 ENCOUNTER — TELEPHONE (OUTPATIENT)
Dept: FAMILY MEDICINE CLINIC | Facility: CLINIC | Age: 53
End: 2018-05-01

## 2018-05-01 NOTE — TELEPHONE ENCOUNTER
Current Outpatient Prescriptions:  lisinopril 10 MG Oral Tab Take 2 tablets (20 mg total) by mouth once daily.  Disp: 1 tablet Rfl: 0     [facility] said pt is requesting lisinopril 20 mg tablet    New rx for them-could not send electronically

## 2018-05-02 RX ORDER — LISINOPRIL 20 MG/1
20 TABLET ORAL DAILY
Qty: 30 TABLET | Refills: 0 | Status: SHIPPED | OUTPATIENT
Start: 2018-05-02 | End: 2018-05-10

## 2018-05-02 NOTE — TELEPHONE ENCOUNTER
Lisinopril was increased from 10-20 mg daily. Patient was not to take the hydrochlorothiazide. Patient was to follow for blood pressure.   Please check with patient and may refill for 30 days and she is to schedule appointment for follow-up on blood press

## 2018-05-02 NOTE — TELEPHONE ENCOUNTER
Advised patient of Dr. Sheron Rodriguez note. Patient verbalized understanding. Appointment made for 5/10/18 at 2:30pm with Dr Danielle Orozco in [address] [place] [address]. Rx sent to pharmacy.

## 2018-05-02 NOTE — TELEPHONE ENCOUNTER
Hypertensive Medications; med listed as historical. Please advise on refill. Thanks.   Protocol Criteria:  · Appointment scheduled in the past 6 months or in the next 3 months  · BMP or CMP in the past 12 months  · Creatinine result < 2  Recent Outpatient V

## 2018-05-10 ENCOUNTER — OFFICE VISIT (OUTPATIENT)
Dept: FAMILY MEDICINE CLINIC | Facility: CLINIC | Age: 53
End: 2018-05-10

## 2018-05-10 VITALS
WEIGHT: 148 LBS | TEMPERATURE: 98 F | HEART RATE: 62 BPM | DIASTOLIC BLOOD PRESSURE: 72 MMHG | BODY MASS INDEX: 27.94 KG/M2 | SYSTOLIC BLOOD PRESSURE: 136 MMHG | HEIGHT: 61 IN

## 2018-05-10 DIAGNOSIS — Z90.49 STATUS POST CHOLECYSTECTOMY: ICD-10-CM

## 2018-05-10 DIAGNOSIS — F32.A ANXIETY AND DEPRESSION: ICD-10-CM

## 2018-05-10 DIAGNOSIS — I10 ESSENTIAL HYPERTENSION: Primary | ICD-10-CM

## 2018-05-10 DIAGNOSIS — F41.9 ANXIETY AND DEPRESSION: ICD-10-CM

## 2018-05-10 PROCEDURE — 99214 OFFICE O/P EST MOD 30 MIN: CPT | Performed by: FAMILY MEDICINE

## 2018-05-10 PROCEDURE — 99212 OFFICE O/P EST SF 10 MIN: CPT | Performed by: FAMILY MEDICINE

## 2018-05-10 RX ORDER — CHOLECALCIFEROL (VITAMIN D3) 50 MCG
TABLET ORAL
COMMUNITY

## 2018-05-10 RX ORDER — LISINOPRIL 20 MG/1
20 TABLET ORAL DAILY
Qty: 90 TABLET | Refills: 1 | Status: SHIPPED | OUTPATIENT
Start: 2018-05-10 | End: 2018-11-30

## 2018-05-10 NOTE — PROGRESS NOTES
HPI:    Patient ID: Karena Rojas is a 46year old female. HPI   Patient presents with:  Medication Request: for BP    Patient here for follow-up on blood pressure. Overall she states she feels well.   She is  from her  and is going to the Rfl:      Allergies:No Known Allergies   PHYSICAL EXAM:   Physical Exam   Constitutional: She is oriented to person, place, and time. She appears well-developed and well-nourished. Eyes: Pupils are equal, round, and reactive to light.    Neck: Normal rang

## 2018-06-04 ENCOUNTER — NURSE TRIAGE (OUTPATIENT)
Dept: OTHER | Age: 53
End: 2018-06-04

## 2018-06-04 NOTE — TELEPHONE ENCOUNTER
Action Requested: Summary for Provider     []  Critical Lab, Recommendations Needed  [] Need Additional Advice  []   FYI    []   Need Orders  [] Need Medications Sent to Pharmacy  []  Other     SUMMARY: DR NI: Patient has appt WED.  4:45pm [facility] w/Dr. Amish Morales

## 2018-06-06 ENCOUNTER — OFFICE VISIT (OUTPATIENT)
Dept: FAMILY MEDICINE CLINIC | Facility: CLINIC | Age: 53
End: 2018-06-06

## 2018-06-06 VITALS
TEMPERATURE: 99 F | HEART RATE: 61 BPM | HEIGHT: 61 IN | WEIGHT: 149 LBS | SYSTOLIC BLOOD PRESSURE: 114 MMHG | BODY MASS INDEX: 28.13 KG/M2 | DIASTOLIC BLOOD PRESSURE: 65 MMHG

## 2018-06-06 DIAGNOSIS — K62.5 RECTAL BLEEDING: Primary | ICD-10-CM

## 2018-06-06 PROCEDURE — 99212 OFFICE O/P EST SF 10 MIN: CPT | Performed by: FAMILY MEDICINE

## 2018-06-06 PROCEDURE — 99214 OFFICE O/P EST MOD 30 MIN: CPT | Performed by: FAMILY MEDICINE

## 2018-06-06 NOTE — PROGRESS NOTES
HPI:    Patient ID: Carole Johnston is a 46year old female. HPI     Patient presents with:  Rectal Bleeding: X 1.5 weeks       No pain  Has bright red blood every time she has a bM. Blood on wiping, some blood mixed in stool.   No constipation/ straining regular rhythm and normal heart sounds. Pulmonary/Chest: Effort normal and breath sounds normal.   Genitourinary: Rectal exam shows no external hemorrhoid, no fissure, no mass, no tenderness, anal tone normal and guaiac negative stool.               ASSE

## 2018-06-08 ENCOUNTER — TELEPHONE (OUTPATIENT)
Dept: GASTROENTEROLOGY | Facility: CLINIC | Age: 53
End: 2018-06-08

## 2018-06-08 ENCOUNTER — LAB ENCOUNTER (OUTPATIENT)
Dept: LAB | Facility: HOSPITAL | Age: 53
End: 2018-06-08
Attending: FAMILY MEDICINE
Payer: COMMERCIAL

## 2018-06-08 DIAGNOSIS — K62.5 RECTAL BLEEDING: ICD-10-CM

## 2018-06-08 PROCEDURE — 85025 COMPLETE CBC W/AUTO DIFF WBC: CPT

## 2018-06-08 PROCEDURE — 36415 COLL VENOUS BLD VENIPUNCTURE: CPT

## 2018-06-08 NOTE — TELEPHONE ENCOUNTER
Pt stated  Is in no pain but  blood in stool and when she wipes herself she sees bright red blood on toilet paper, no blood in toilet unless pt has bowel movement. No constipation/ no diarrhea/ no cramping/ no vomiting or nausea.     This has been going on

## 2018-06-11 ENCOUNTER — TELEPHONE (OUTPATIENT)
Dept: GASTROENTEROLOGY | Facility: CLINIC | Age: 53
End: 2018-06-11

## 2018-06-11 ENCOUNTER — OFFICE VISIT (OUTPATIENT)
Dept: GASTROENTEROLOGY | Facility: CLINIC | Age: 53
End: 2018-06-11

## 2018-06-11 VITALS
HEIGHT: 61 IN | SYSTOLIC BLOOD PRESSURE: 154 MMHG | DIASTOLIC BLOOD PRESSURE: 81 MMHG | WEIGHT: 153 LBS | HEART RATE: 65 BPM | BODY MASS INDEX: 28.89 KG/M2

## 2018-06-11 DIAGNOSIS — K62.5 RECTAL BLEEDING: Primary | ICD-10-CM

## 2018-06-11 PROCEDURE — 99243 OFF/OP CNSLTJ NEW/EST LOW 30: CPT | Performed by: INTERNAL MEDICINE

## 2018-06-11 PROCEDURE — 99212 OFFICE O/P EST SF 10 MIN: CPT | Performed by: INTERNAL MEDICINE

## 2018-06-11 NOTE — PROGRESS NOTES
HPI:    Patient ID: Nikki Temple is a 46year old female. HPI  Yovana Membreno returns in follow-up at the request of Dr Phyllis Richards. She was last seen in January 2012.   At that time she underwent upper and lower endoscopy for rectal bleeding and an iron deficiency anemi 20 MG Oral Tab Take 1 tablet (20 mg total) by mouth nightly. Disp: 90 tablet Rfl: 3   escitalopram 10 MG Oral Tab Take 1 tablet (10 mg total) by mouth once daily. Disp: 90 tablet Rfl: 3   aspirin 81 MG Oral Tab Take 81 mg by mouth daily.  Disp:  Rfl:    Flu 32.0 - 37.0 g/dl 33.9   RDW      11.0 - 15.0 % 13.6   Platelet Count      568 - 400 K/   MEAN PLATELET VOLUME      7.4 - 10.3 fL 8.0   Neutrophils %      % 48   Lymphocytes %      % 38   Monocytes %      % 8   Eosinophils %      % 6   Basophils %

## 2018-06-11 NOTE — TELEPHONE ENCOUNTER
Scheduled for:  Colonoscopy 73137  Provider Name: Dr. Tiny Doyle  Date:  6/15/18  Location:  [facility]  Sedation:  MAC  Time:  1:00 pm, arrival 12:00 pm  Prep: Suprep  Meds/Allergies Reconciled?:  Physician reviewed  Diagnosis with codes:  Rectal bleeding K62.5  Was pat

## 2018-06-15 ENCOUNTER — SURGERY (OUTPATIENT)
Age: 53
End: 2018-06-15

## 2018-06-15 ENCOUNTER — ANESTHESIA EVENT (OUTPATIENT)
Dept: ENDOSCOPY | Facility: HOSPITAL | Age: 53
End: 2018-06-15
Payer: COMMERCIAL

## 2018-06-15 ENCOUNTER — TELEPHONE (OUTPATIENT)
Dept: GASTROENTEROLOGY | Facility: CLINIC | Age: 53
End: 2018-06-15

## 2018-06-15 ENCOUNTER — ANESTHESIA (OUTPATIENT)
Dept: ENDOSCOPY | Facility: HOSPITAL | Age: 53
End: 2018-06-15
Payer: COMMERCIAL

## 2018-06-15 ENCOUNTER — HOSPITAL ENCOUNTER (OUTPATIENT)
Facility: HOSPITAL | Age: 53
Setting detail: HOSPITAL OUTPATIENT SURGERY
Discharge: HOME | End: 2018-06-15
Attending: INTERNAL MEDICINE | Admitting: INTERNAL MEDICINE
Payer: COMMERCIAL

## 2018-06-15 DIAGNOSIS — K62.5 RECTAL BLEEDING: ICD-10-CM

## 2018-06-15 PROCEDURE — 45380 COLONOSCOPY AND BIOPSY: CPT | Performed by: INTERNAL MEDICINE

## 2018-06-15 PROCEDURE — 0DBP8ZX EXCISION OF RECTUM, VIA NATURAL OR ARTIFICIAL OPENING ENDOSCOPIC, DIAGNOSTIC: ICD-10-PCS | Performed by: INTERNAL MEDICINE

## 2018-06-15 RX ORDER — SODIUM CHLORIDE, SODIUM LACTATE, POTASSIUM CHLORIDE, CALCIUM CHLORIDE 600; 310; 30; 20 MG/100ML; MG/100ML; MG/100ML; MG/100ML
INJECTION, SOLUTION INTRAVENOUS CONTINUOUS
Status: DISCONTINUED | OUTPATIENT
Start: 2018-06-15 | End: 2018-06-15

## 2018-06-15 RX ORDER — NALOXONE HYDROCHLORIDE 0.4 MG/ML
80 INJECTION, SOLUTION INTRAMUSCULAR; INTRAVENOUS; SUBCUTANEOUS AS NEEDED
Status: DISCONTINUED | OUTPATIENT
Start: 2018-06-15 | End: 2018-06-15

## 2018-06-15 RX ORDER — MESALAMINE 1000 MG/1
1000 SUPPOSITORY RECTAL NIGHTLY
Qty: 30 SUPPOSITORY | Refills: 1 | Status: SHIPPED | OUTPATIENT
Start: 2018-06-15 | End: 2018-12-12 | Stop reason: ALTCHOICE

## 2018-06-15 RX ADMIN — SODIUM CHLORIDE, SODIUM LACTATE, POTASSIUM CHLORIDE, CALCIUM CHLORIDE: 600; 310; 30; 20 INJECTION, SOLUTION INTRAVENOUS at 13:15:00

## 2018-06-15 RX ADMIN — SODIUM CHLORIDE, SODIUM LACTATE, POTASSIUM CHLORIDE, CALCIUM CHLORIDE: 600; 310; 30; 20 INJECTION, SOLUTION INTRAVENOUS at 13:45:00

## 2018-06-15 NOTE — OPERATIVE REPORT
[facility] Endoscopy Report      Date of Procedure:  06/15/18      Preoperative Diagnosis:  1. Rectal bleeding  2.   Ulcerative proctitis (2012)      Postoperative Diagnosis:  Ulcerative proctitis (5 cm)      Procedure:    Colonoscopy with biopsy results. 2.  Mesalamine suppositories.         Hunter Swift MD  6/15/2018

## 2018-06-15 NOTE — H&P
History & Physical Examination    Patient Name: Author Vy  MRN: [id number]  CSN: [id number]  YOB: 1965    Diagnosis: Rectal bleeding        Prescriptions Prior to Admission:  Cholecalciferol (VITAMIN D) 2000 units Oral Tab Take by mouth.  Good [place] status: Never Smoker    Smokeless tobacco: Never Used    Alcohol use No       SYSTEM Check if Review is Normal Check if Physical Exam is Normal If not normal, please explain:   LILLIAN Rondon.Syed ] [ Delaney Rondon.Syed ] [ X]    HEART Alcon.Syed ] [ Rosey Rondon.Syed ] [ Carla Segal    ABD

## 2018-06-15 NOTE — ANESTHESIA POSTPROCEDURE EVALUATION
Patient: Gregorio Velázquez    Procedure Summary     Date:  06/15/18 Room / Location:  [address] [place] [address] ENDOSCOPY 01 / [address] [place] [address] ENDOSCOPY    Anesthesia Start:  0928 Anesthesia Stop:      Procedure:  COLONOSCOPY (N/A ) Diagnosis:       Rectal bleeding      (Ulcerative proctitis)

## 2018-06-15 NOTE — ANESTHESIA PREPROCEDURE EVALUATION
Anesthesia PreOp Note    HPI:     Kevin Lawrence is a 46year old female who presents for preoperative consultation requested by: Moses Bella MD    Date of Surgery: 6/15/2018    Procedure(s):  COLONOSCOPY  Indication: Rectal bleeding [K62.5]    Rele (150 mg total) by mouth 2 (two) times daily. Disp: 60 tablet Rfl: 0 Taking   atorvastatin 20 MG Oral Tab Take 1 tablet (20 mg total) by mouth nightly.  Disp: 90 tablet Rfl: 3 Taking   escitalopram 10 MG Oral Tab Take 1 tablet (10 mg total) by mouth once isreal height is 1.549 m (5' 1\") and weight is 68.9 kg (152 lb). Her blood pressure is 165/69 (abnormal) and her pulse is 61. Her respiration is 12 and oxygen saturation is 100%.     06/15/18  1308   BP: (!) 165/69   BP Location: Left arm   Pulse: 61   Resp: 12

## 2018-06-16 VITALS
DIASTOLIC BLOOD PRESSURE: 74 MMHG | SYSTOLIC BLOOD PRESSURE: 128 MMHG | WEIGHT: 152 LBS | BODY MASS INDEX: 28.7 KG/M2 | HEART RATE: 63 BPM | HEIGHT: 61 IN | OXYGEN SATURATION: 100 % | RESPIRATION RATE: 21 BRPM

## 2018-06-20 ENCOUNTER — TELEPHONE (OUTPATIENT)
Dept: GASTROENTEROLOGY | Facility: CLINIC | Age: 53
End: 2018-06-20

## 2018-06-20 NOTE — TELEPHONE ENCOUNTER
----- Message from Debora Roldan MD sent at 6/19/2018  6:48 PM CDT -----  I spoke to [address] [place] [address]. Her biopsies are consistent with ulcerative proctitis. She started the [address] yesterday. She will take the medication until symptom resolution.   Once sympt

## 2018-08-30 ENCOUNTER — TELEPHONE (OUTPATIENT)
Dept: FAMILY MEDICINE CLINIC | Facility: CLINIC | Age: 53
End: 2018-08-30

## 2018-11-30 RX ORDER — LISINOPRIL 20 MG/1
20 TABLET ORAL DAILY
Qty: 90 TABLET | Refills: 0 | Status: SHIPPED | OUTPATIENT
Start: 2018-11-30 | End: 2018-12-05 | Stop reason: SDUPTHER

## 2018-11-30 RX ORDER — ATORVASTATIN CALCIUM 20 MG/1
20 TABLET, FILM COATED ORAL NIGHTLY
Qty: 90 TABLET | Refills: 0 | Status: SHIPPED | OUTPATIENT
Start: 2018-11-30 | End: 2019-01-11

## 2018-11-30 NOTE — TELEPHONE ENCOUNTER
Pt requesting refill for     Current Outpatient Medications:  lisinopril 20 MG Oral Tab Take 1 tablet (20 mg total) by mouth daily. Disp: 90 tablet Rfl: 1   atorvastatin 20 MG Oral Tab Take 1 tablet (20 mg total) by mouth nightly.  Disp: 90 tablet Rfl: 3       Pt states she only has two pills left for lisinopril

## 2018-12-03 NOTE — TELEPHONE ENCOUNTER
[facility] Lis[facility]ril and Atorvastatin as sent to [facility] on 11/30/18 filled at [facility]. Pharmacist will contact [facility].

## 2018-12-04 NOTE — TELEPHONE ENCOUNTER
LMTCB for pt to verify where she wants the rx sent and/or if it's ok to leave it at the [facility].

## 2018-12-05 RX ORDER — LISINOPRIL 20 MG/1
TABLET ORAL
Qty: 90 TABLET | Refills: 0 | Status: SHIPPED | OUTPATIENT
Start: 2018-12-05 | End: 2018-12-12

## 2018-12-05 NOTE — TELEPHONE ENCOUNTER
Refill passed per [facility] protocol. See other encounter. RX was to be sent to [facility].

## 2018-12-12 ENCOUNTER — OFFICE VISIT (OUTPATIENT)
Dept: FAMILY MEDICINE CLINIC | Facility: CLINIC | Age: 53
End: 2018-12-12
Payer: COMMERCIAL

## 2018-12-12 VITALS
WEIGHT: 156 LBS | DIASTOLIC BLOOD PRESSURE: 82 MMHG | BODY MASS INDEX: 29.45 KG/M2 | HEIGHT: 61 IN | TEMPERATURE: 98 F | HEART RATE: 81 BPM | SYSTOLIC BLOOD PRESSURE: 159 MMHG

## 2018-12-12 DIAGNOSIS — I10 ESSENTIAL HYPERTENSION: ICD-10-CM

## 2018-12-12 DIAGNOSIS — M25.649 STIFFNESS OF HAND JOINT, UNSPECIFIED LATERALITY: ICD-10-CM

## 2018-12-12 DIAGNOSIS — E66.3 OVERWEIGHT (BMI 25.0-29.9): ICD-10-CM

## 2018-12-12 DIAGNOSIS — E78.00 HYPERCHOLESTEREMIA: Primary | ICD-10-CM

## 2018-12-12 PROCEDURE — 90686 IIV4 VACC NO PRSV 0.5 ML IM: CPT | Performed by: FAMILY MEDICINE

## 2018-12-12 PROCEDURE — 90471 IMMUNIZATION ADMIN: CPT | Performed by: FAMILY MEDICINE

## 2018-12-12 PROCEDURE — 99212 OFFICE O/P EST SF 10 MIN: CPT | Performed by: FAMILY MEDICINE

## 2018-12-12 PROCEDURE — 99214 OFFICE O/P EST MOD 30 MIN: CPT | Performed by: FAMILY MEDICINE

## 2018-12-12 RX ORDER — LISINOPRIL 20 MG/1
20 TABLET ORAL
Qty: 90 TABLET | Refills: 1 | Status: SHIPPED | OUTPATIENT
Start: 2018-12-12 | End: 2019-11-29

## 2018-12-12 RX ORDER — HYDROCHLOROTHIAZIDE 25 MG/1
25 TABLET ORAL EVERY MORNING
Qty: 90 TABLET | Refills: 0 | Status: SHIPPED | OUTPATIENT
Start: 2018-12-12 | End: 2019-03-12

## 2018-12-12 NOTE — PROGRESS NOTES
HPI:    Patient ID: Sierra Mccall is a 48year old female. HPI     Patient here for follow-up on her blood pressure and cholesterol. She states her blood pressure at home normally runs systolic 237. Denies any chest pain shortness of breath.   She has ga Allergies:No Known Allergies   PHYSICAL EXAM:   Physical Exam   Constitutional: She is oriented to person, place, and time. She appears well-developed and well-nourished. Eyes: Pupils are equal, round, and reactive to light.    Neck: Normal range of m Imaging & Referrals:  None       #[id number]

## 2018-12-12 NOTE — PATIENT INSTRUCTIONS
[address] (2 Gramos Al Día) [Low Salt Diet – 2 Grams/Day]  Esta dieta elimina los alimentos con alto contenido de sal y reduce (hace menos) la cantidad de sal que se puede usar para cocinar.  Esta dieta es usada para personas con jonathan presión ar EVITE: La mayoría de los pasteles, tortas y galletas preparadas or procesadas con erick; pudín  Betina   SÍ: Toda carne fresca, pescado, kevin y atún bajo en sal  EVITE: Carne, pescado o kevin ahumados, encurtidos o preparados con sal; esto incluye el tocino EVITE: Evite la salsa de tomate (ketchup), los pepinos encurtidos (pickles), los condimentos encurtidos (relishes), las salsas chinas (soya y teriyaki), la salsa de barbacoa, salsa tártaro, salsas para ablandar la carne, salsa chili, y las salsas comunes p · Betina curadas, jermaine tocineta, tiras de [address], boloña, carne en [place], [place], perros calientes, betina para sándwich, salchichas  · Comidas rápidas, jermaine burritos, sándwiches de pescado, malteadas, helena fritas con sal, tacos  · Comidas congelada

## 2019-01-05 ENCOUNTER — APPOINTMENT (OUTPATIENT)
Dept: LAB | Age: 54
End: 2019-01-05
Attending: FAMILY MEDICINE
Payer: COMMERCIAL

## 2019-01-05 DIAGNOSIS — E78.00 HYPERCHOLESTEREMIA: ICD-10-CM

## 2019-01-05 PROCEDURE — 80061 LIPID PANEL: CPT

## 2019-01-05 PROCEDURE — 36415 COLL VENOUS BLD VENIPUNCTURE: CPT

## 2019-01-05 PROCEDURE — 80053 COMPREHEN METABOLIC PANEL: CPT

## 2019-01-06 LAB
ALBUMIN SERPL BCP-MCNC: 4.1 G/DL (ref 3.5–4.8)
ALBUMIN/GLOB SERPL: 1.5 {RATIO} (ref 1–2)
ALP SERPL-CCNC: 90 U/L (ref 32–100)
ALT SERPL-CCNC: 36 U/L (ref 14–54)
ANION GAP SERPL CALC-SCNC: 9 MMOL/L (ref 0–18)
AST SERPL-CCNC: 31 U/L (ref 15–41)
BILIRUB SERPL-MCNC: 0.7 MG/DL (ref 0.3–1.2)
BUN SERPL-MCNC: 12 MG/DL (ref 8–20)
BUN/CREAT SERPL: 15.4 (ref 10–20)
CALCIUM SERPL-MCNC: 9.4 MG/DL (ref 8.5–10.5)
CHLORIDE SERPL-SCNC: 106 MMOL/L (ref 95–110)
CHOLEST SERPL-MCNC: 165 MG/DL (ref 110–200)
CO2 SERPL-SCNC: 24 MMOL/L (ref 22–32)
CREAT SERPL-MCNC: 0.78 MG/DL (ref 0.5–1.5)
GLOBULIN PLAS-MCNC: 2.8 G/DL (ref 2.5–3.7)
GLUCOSE SERPL-MCNC: 84 MG/DL (ref 70–99)
HDLC SERPL-MCNC: 57 MG/DL
LDLC SERPL CALC-MCNC: 70 MG/DL (ref 0–99)
NONHDLC SERPL-MCNC: 108 MG/DL
OSMOLALITY UR CALC.SUM OF ELEC: 287 MOSM/KG (ref 275–295)
PATIENT FASTING: YES
POTASSIUM SERPL-SCNC: 4.4 MMOL/L (ref 3.3–5.1)
PROT SERPL-MCNC: 6.9 G/DL (ref 5.9–8.4)
SODIUM SERPL-SCNC: 139 MMOL/L (ref 136–144)
TRIGL SERPL-MCNC: 188 MG/DL (ref 1–149)

## 2019-01-11 RX ORDER — ATORVASTATIN CALCIUM 20 MG/1
20 TABLET, FILM COATED ORAL NIGHTLY
Qty: 90 TABLET | Refills: 3 | Status: SHIPPED | OUTPATIENT
Start: 2019-01-11 | End: 2020-01-05

## 2019-01-16 ENCOUNTER — TELEPHONE (OUTPATIENT)
Dept: OTHER | Age: 54
End: 2019-01-16

## 2019-01-16 NOTE — TELEPHONE ENCOUNTER
Pt calling wanting to know her results. Informed her of  message below. She stated that she will call us back for a px appt. thanks    Notes recorded by Tammy Grullon MD on 1/11/2019 at 10:49 AM CST  Patient due for a physical.  Please inform patient

## 2019-03-12 ENCOUNTER — OFFICE VISIT (OUTPATIENT)
Dept: FAMILY MEDICINE CLINIC | Facility: CLINIC | Age: 54
End: 2019-03-12
Payer: COMMERCIAL

## 2019-03-12 VITALS
HEIGHT: 61 IN | DIASTOLIC BLOOD PRESSURE: 82 MMHG | WEIGHT: 160 LBS | BODY MASS INDEX: 30.21 KG/M2 | HEART RATE: 65 BPM | TEMPERATURE: 98 F | SYSTOLIC BLOOD PRESSURE: 177 MMHG

## 2019-03-12 DIAGNOSIS — M79.641 RIGHT HAND PAIN: Primary | ICD-10-CM

## 2019-03-12 DIAGNOSIS — I10 ESSENTIAL HYPERTENSION: ICD-10-CM

## 2019-03-12 DIAGNOSIS — M25.641 MORNING JOINT STIFFNESS OF RIGHT HAND: ICD-10-CM

## 2019-03-12 DIAGNOSIS — R63.5 WEIGHT GAIN, ABNORMAL: ICD-10-CM

## 2019-03-12 PROCEDURE — 99212 OFFICE O/P EST SF 10 MIN: CPT | Performed by: FAMILY MEDICINE

## 2019-03-12 PROCEDURE — 99214 OFFICE O/P EST MOD 30 MIN: CPT | Performed by: FAMILY MEDICINE

## 2019-03-12 NOTE — PROGRESS NOTES
HPI:    Patient ID: Gregorio Velázquez is a 48year old female.     HPI  Patient presents with:  Finger Pain: pt states she might have trigger finger on right middle finger      Patient here with stiffness right hand , in morning better as day progresses except mi Allergies:No Known Allergies   PHYSICAL EXAM:   Patient presents with:  Finger Pain: pt states she might have trigger finger on right middle finger       Physical Exam   Constitutional: She is oriented to person, place, and time.  She appears well-devel RIGHT (CPT=43111)       [id number]

## 2019-04-15 ENCOUNTER — LAB ENCOUNTER (OUTPATIENT)
Dept: LAB | Facility: HOSPITAL | Age: 54
End: 2019-04-15
Attending: OBSTETRICS & GYNECOLOGY
Payer: COMMERCIAL

## 2019-04-15 ENCOUNTER — OFFICE VISIT (OUTPATIENT)
Dept: OBGYN CLINIC | Facility: CLINIC | Age: 54
End: 2019-04-15
Payer: COMMERCIAL

## 2019-04-15 VITALS
WEIGHT: 160 LBS | SYSTOLIC BLOOD PRESSURE: 126 MMHG | HEIGHT: 61 IN | BODY MASS INDEX: 30.21 KG/M2 | DIASTOLIC BLOOD PRESSURE: 70 MMHG

## 2019-04-15 DIAGNOSIS — Z78.0 MENOPAUSE: ICD-10-CM

## 2019-04-15 DIAGNOSIS — Z12.31 SCREENING MAMMOGRAM, ENCOUNTER FOR: ICD-10-CM

## 2019-04-15 DIAGNOSIS — N64.59 BREAST SIGNS AND SYMPTOMS: ICD-10-CM

## 2019-04-15 DIAGNOSIS — R53.83 FATIGUE, UNSPECIFIED TYPE: Primary | ICD-10-CM

## 2019-04-15 DIAGNOSIS — R53.83 FATIGUE, UNSPECIFIED TYPE: ICD-10-CM

## 2019-04-15 DIAGNOSIS — R19.8 ABDOMINAL SYMPTOMS: ICD-10-CM

## 2019-04-15 PROCEDURE — 85025 COMPLETE CBC W/AUTO DIFF WBC: CPT

## 2019-04-15 PROCEDURE — 36415 COLL VENOUS BLD VENIPUNCTURE: CPT

## 2019-04-15 PROCEDURE — 84443 ASSAY THYROID STIM HORMONE: CPT

## 2019-04-15 PROCEDURE — 99203 OFFICE O/P NEW LOW 30 MIN: CPT | Performed by: OBSTETRICS & GYNECOLOGY

## 2019-04-15 PROCEDURE — 84702 CHORIONIC GONADOTROPIN TEST: CPT

## 2019-04-15 PROCEDURE — 80053 COMPREHEN METABOLIC PANEL: CPT

## 2019-04-15 NOTE — PROGRESS NOTES
HPI:   Gregorio Velázquez is a 48year old female who presents for a consult visit for symptoms of breasts feeling a little different and somewhat heavier,  Abdominal wall feeling a little heavy, feeling tired , all for several weeks.    Pt is 2 years postmenopaus Rfl:       Past Medical History:   Diagnosis Date   • Anxiety state, unspecified    • Bradycardia    • Hyperlipidemia    • Screening for lipoid disorders 3/7/2015    lipid panel   • Unspecified essential hypertension       Past Surgical History:   Procedur is pink,no adnexal masses or tenderness    MUSCULOSKELETAL: back is not tender,FROM of the back  EXTREMITIES: no cyanosis, clubbing or edema  NEURO: Oriented times three,    ASSESSMENT AND PLAN:   Boris Manley is a 48year old female who presents for a cons

## 2019-04-22 ENCOUNTER — TELEPHONE (OUTPATIENT)
Dept: OBGYN CLINIC | Facility: CLINIC | Age: 54
End: 2019-04-22

## 2019-04-22 NOTE — TELEPHONE ENCOUNTER
Pt requesting lab results from 4/15/19. Pt states that she would like results no later than today.  Pls advise

## 2019-04-23 NOTE — TELEPHONE ENCOUNTER
Pt appraised of all lab results. Bun/creat ratio slightly high, alk phos marginally high,  Pt to call dr Rock Alvarez office tomorrow for f/u and to ask when she needs to get her other labs which were ordered on 3/12/19 by Dr. Stephanie Echeverria.

## 2019-04-23 NOTE — TELEPHONE ENCOUNTER
LMTCB please transfer to triage. Thanks  Pls see  message below. Pt needs a appt and needs to have labs done.

## 2019-04-23 NOTE — TELEPHONE ENCOUNTER
Thank you ! I will have my staff follow up   Patient advise patient to follow up after labs that were ordered by me in march 2019.

## 2019-04-26 ENCOUNTER — TELEPHONE (OUTPATIENT)
Dept: OTHER | Age: 54
End: 2019-04-26

## 2019-04-26 ENCOUNTER — APPOINTMENT (OUTPATIENT)
Dept: LAB | Facility: HOSPITAL | Age: 54
End: 2019-04-26
Attending: FAMILY MEDICINE
Payer: COMMERCIAL

## 2019-04-26 DIAGNOSIS — R63.5 WEIGHT GAIN, ABNORMAL: ICD-10-CM

## 2019-04-26 DIAGNOSIS — M79.641 RIGHT HAND PAIN: ICD-10-CM

## 2019-04-26 PROCEDURE — 85652 RBC SED RATE AUTOMATED: CPT

## 2019-04-26 PROCEDURE — 84443 ASSAY THYROID STIM HORMONE: CPT

## 2019-04-26 PROCEDURE — 86431 RHEUMATOID FACTOR QUANT: CPT

## 2019-04-26 PROCEDURE — 36415 COLL VENOUS BLD VENIPUNCTURE: CPT

## 2019-04-26 NOTE — TELEPHONE ENCOUNTER
Patient called back (verified name and ), advised Dr Alice Gee note and verbalized understanding. .Central scheduling number [phone]  given for her mammogram schedule.  Patient verify the last pap smear and mammogram results, advised that papsmear was

## 2019-04-30 ENCOUNTER — HOSPITAL ENCOUNTER (OUTPATIENT)
Dept: MAMMOGRAPHY | Facility: HOSPITAL | Age: 54
Discharge: HOME | End: 2019-04-30
Attending: OBSTETRICS & GYNECOLOGY
Payer: COMMERCIAL

## 2019-04-30 DIAGNOSIS — Z12.31 SCREENING MAMMOGRAM, ENCOUNTER FOR: ICD-10-CM

## 2019-04-30 PROCEDURE — 77067 SCR MAMMO BI INCL CAD: CPT | Performed by: OBSTETRICS & GYNECOLOGY

## 2019-04-30 PROCEDURE — 77063 BREAST TOMOSYNTHESIS BI: CPT | Performed by: OBSTETRICS & GYNECOLOGY

## 2019-05-15 ENCOUNTER — HOSPITAL ENCOUNTER (OUTPATIENT)
Dept: MAMMOGRAPHY | Facility: HOSPITAL | Age: 54
Discharge: HOME | End: 2019-05-15
Attending: OBSTETRICS & GYNECOLOGY
Payer: COMMERCIAL

## 2019-05-15 DIAGNOSIS — R92.8 ABNORMAL MAMMOGRAM: ICD-10-CM

## 2019-05-15 PROCEDURE — 77061 BREAST TOMOSYNTHESIS UNI: CPT | Performed by: OBSTETRICS & GYNECOLOGY

## 2019-05-15 PROCEDURE — 77065 DX MAMMO INCL CAD UNI: CPT | Performed by: OBSTETRICS & GYNECOLOGY

## 2019-05-24 ENCOUNTER — HOSPITAL ENCOUNTER (EMERGENCY)
Facility: HOSPITAL | Age: 54
Discharge: HOME | End: 2019-05-24
Attending: EMERGENCY MEDICINE
Payer: COMMERCIAL

## 2019-05-24 VITALS
BODY MASS INDEX: 30.21 KG/M2 | HEART RATE: 63 BPM | SYSTOLIC BLOOD PRESSURE: 134 MMHG | WEIGHT: 160 LBS | RESPIRATION RATE: 16 BRPM | TEMPERATURE: 98 F | OXYGEN SATURATION: 98 % | DIASTOLIC BLOOD PRESSURE: 74 MMHG | HEIGHT: 61 IN

## 2019-05-24 DIAGNOSIS — R53.83 FATIGUE, UNSPECIFIED TYPE: Primary | ICD-10-CM

## 2019-05-24 PROCEDURE — 80048 BASIC METABOLIC PNL TOTAL CA: CPT | Performed by: EMERGENCY MEDICINE

## 2019-05-24 PROCEDURE — 93005 ELECTROCARDIOGRAM TRACING: CPT

## 2019-05-24 PROCEDURE — 99283 EMERGENCY DEPT VISIT LOW MDM: CPT

## 2019-05-24 PROCEDURE — 36415 COLL VENOUS BLD VENIPUNCTURE: CPT

## 2019-05-24 PROCEDURE — 84484 ASSAY OF TROPONIN QUANT: CPT | Performed by: EMERGENCY MEDICINE

## 2019-05-24 PROCEDURE — 93010 ELECTROCARDIOGRAM REPORT: CPT | Performed by: EMERGENCY MEDICINE

## 2019-05-24 PROCEDURE — 85025 COMPLETE CBC W/AUTO DIFF WBC: CPT | Performed by: EMERGENCY MEDICINE

## 2019-05-24 NOTE — ED INITIAL ASSESSMENT (HPI)
Pt here for left arm and BLE weakness that began at approximately 1:30 PM today while she was at [facility]. Patient was able to drive home after her coffee.

## 2019-05-25 NOTE — ED NOTES
Pt dcd to home with family, discharge instruction given and voices understanding,    denies any concern

## 2019-05-26 NOTE — ED PROVIDER NOTES
Patient Seen in: [facility] AND [facility] Emergency Department    History   Patient presents with:  Numbness Weakness (neurologic)  Arm Pain      HPI    Patient presents to the ED feeling generally fatigued.   She states symptoms started earlier today when she w negative. Constitutional and vital signs reviewed. Social History and Family History elements reviewed from today, pertinent positives to the presenting problem noted.     Physical Exam     ED Triage Vitals [05/24/19 1849]   BP (!) 176/100   Pulse 8 orders. RAINBOW DRAW BLUE   RAINBOW DRAW LAVENDER   RAINBOW DRAW LIGHT GREEN   RAINBOW DRAW GOLD   CBC W/ DIFFERENTIAL     EKG    Rate, intervals and axes as noted on EKG Report.   Rate: Normal  Rhythm: Sinus Rhythm  Reading: Normal intervals, normal EKG 5/24/2019 10:39 PM

## 2019-10-24 ENCOUNTER — OFFICE VISIT (OUTPATIENT)
Dept: FAMILY MEDICINE CLINIC | Facility: CLINIC | Age: 54
End: 2019-10-24
Payer: COMMERCIAL

## 2019-10-24 VITALS
SYSTOLIC BLOOD PRESSURE: 126 MMHG | BODY MASS INDEX: 30.21 KG/M2 | WEIGHT: 160 LBS | TEMPERATURE: 99 F | HEIGHT: 61 IN | HEART RATE: 66 BPM | DIASTOLIC BLOOD PRESSURE: 73 MMHG

## 2019-10-24 DIAGNOSIS — R05.9 COUGH: Primary | ICD-10-CM

## 2019-10-24 PROCEDURE — 99213 OFFICE O/P EST LOW 20 MIN: CPT | Performed by: PHYSICIAN ASSISTANT

## 2019-10-24 RX ORDER — BENZONATATE 200 MG/1
200 CAPSULE ORAL 3 TIMES DAILY PRN
Qty: 30 CAPSULE | Refills: 0 | Status: SHIPPED | OUTPATIENT
Start: 2019-10-24 | End: 2019-12-17

## 2019-10-24 RX ORDER — AZITHROMYCIN 250 MG/1
TABLET, FILM COATED ORAL
Qty: 6 TABLET | Refills: 0 | Status: SHIPPED | OUTPATIENT
Start: 2019-10-24 | End: 2019-12-17 | Stop reason: ALTCHOICE

## 2019-10-24 NOTE — PROGRESS NOTES
HPI:    Patient ID: Jane Franks is a 47year old female. Pt presents with cold symptoms for the past 5 days. Pt has had cough (productive with yellow mucous). Had sore throat and sneezing in the beginning. Chest congestion noted. No fevers.  Pt has tried well-developed and well-nourished. HENT:   Right Ear: Tympanic membrane, external ear and ear canal normal. Tympanic membrane is not erythematous.  No cerumen present  Left Ear: Tympanic membrane, external ear and ear canal normal. Tympanic membrane is no

## 2019-10-24 NOTE — PATIENT INSTRUCTIONS
Z-pack  Take 2 tablets together today, then 1 tablet for the next 4 days. You take it for 5 days, but it lasts in your system for 10 days. Benzonatate   Take 1 pill 3 times a day as needed for the cough.      Hydrate   Steam from the shower

## 2019-11-30 RX ORDER — LISINOPRIL 20 MG/1
TABLET ORAL
Qty: 90 TABLET | Refills: 1 | Status: SHIPPED | OUTPATIENT
Start: 2019-11-30 | End: 2019-12-17

## 2019-11-30 NOTE — TELEPHONE ENCOUNTER
Refill passed per [facility] protocol.   Hypertensive Medications  Protocol Criteria:  · Appointment scheduled in the past 6 months or in the next 3 months  · BMP or CMP in the past 12 months  · Creatinine result < 2  Recent Outpatient Visits

## 2019-12-17 ENCOUNTER — OFFICE VISIT (OUTPATIENT)
Dept: FAMILY MEDICINE CLINIC | Facility: CLINIC | Age: 54
End: 2019-12-17
Payer: COMMERCIAL

## 2019-12-17 VITALS
BODY MASS INDEX: 30.78 KG/M2 | TEMPERATURE: 97 F | WEIGHT: 163 LBS | DIASTOLIC BLOOD PRESSURE: 74 MMHG | SYSTOLIC BLOOD PRESSURE: 127 MMHG | HEART RATE: 69 BPM | HEIGHT: 61 IN

## 2019-12-17 DIAGNOSIS — Z87.42 HISTORY OF ABNORMAL CERVICAL PAP SMEAR: ICD-10-CM

## 2019-12-17 DIAGNOSIS — I10 ESSENTIAL HYPERTENSION: ICD-10-CM

## 2019-12-17 DIAGNOSIS — F32.A ANXIETY AND DEPRESSION: ICD-10-CM

## 2019-12-17 DIAGNOSIS — F41.9 ANXIETY AND DEPRESSION: ICD-10-CM

## 2019-12-17 DIAGNOSIS — E78.00 HYPERCHOLESTEREMIA: ICD-10-CM

## 2019-12-17 DIAGNOSIS — E66.3 OVERWEIGHT (BMI 25.0-29.9): ICD-10-CM

## 2019-12-17 DIAGNOSIS — Z11.3 SCREENING EXAMINATION FOR STD (SEXUALLY TRANSMITTED DISEASE): ICD-10-CM

## 2019-12-17 DIAGNOSIS — Z90.49 STATUS POST CHOLECYSTECTOMY: ICD-10-CM

## 2019-12-17 DIAGNOSIS — Z00.00 WELL ADULT EXAM: Primary | ICD-10-CM

## 2019-12-17 DIAGNOSIS — Z01.419 ENCOUNTER FOR GYNECOLOGICAL EXAMINATION: ICD-10-CM

## 2019-12-17 PROBLEM — R19.8 ABDOMINAL SYMPTOMS: Status: RESOLVED | Noted: 2019-04-15 | Resolved: 2019-12-17

## 2019-12-17 PROBLEM — R53.83 FATIGUE: Status: RESOLVED | Noted: 2019-04-15 | Resolved: 2019-12-17

## 2019-12-17 PROBLEM — N64.59 BREAST SIGNS AND SYMPTOMS: Status: RESOLVED | Noted: 2019-04-15 | Resolved: 2019-12-17

## 2019-12-17 PROCEDURE — 99396 PREV VISIT EST AGE 40-64: CPT | Performed by: FAMILY MEDICINE

## 2019-12-17 RX ORDER — LISINOPRIL 20 MG/1
20 TABLET ORAL
Qty: 90 TABLET | Refills: 3 | Status: SHIPPED | OUTPATIENT
Start: 2019-12-17 | End: 2020-11-17

## 2019-12-17 NOTE — PROGRESS NOTES
HPI:    Patient ID: Ling Rooney is a 47year old female. HPI  Patient presents with:  Routine Physical      With her boyfriend  Sexually active  No protection. Non smoker.     Review of Systems   Constitutional: Negative for activity change, disorders 3/7/2015    lipid panel   • Unspecified essential hypertension      Past Surgical History:   Procedure Laterality Date   •       1   • CHOLECYSTECTOMY     • COLONOSCOPY N/A 6/15/2018    Performed by Avi Salazar MD at [address] Not Asked        Weight Concern: Not Asked        Special Diet: Not Asked        Back Care: Not Asked        Exercise: No        Bike Helmet: Not Asked        Seat Belt: Not Asked        Self-Exams: Not Asked    Social History Narrative      The patient do regular rhythm and normal heart sounds. No murmur heard. Pulmonary/Chest: Effort normal and breath sounds normal. She has no wheezes. No breast swelling, tenderness, discharge or bleeding. Abdominal: Soft.  Bowel sounds are normal. She exhibits no mass Encounter for gynecological examination  Pap, pelvic and breast exam done  - THINPREP PAP WITH HPV REFLEX REQUEST B; Future    3. Status post cholecystectomy      4. Hypercholesteremia  Recheck lipids    5. Overweight (BMI 25.0-29. 9)  Highly recommend to l

## 2019-12-21 ENCOUNTER — LAB ENCOUNTER (OUTPATIENT)
Dept: LAB | Age: 54
End: 2019-12-21
Attending: FAMILY MEDICINE
Payer: COMMERCIAL

## 2019-12-21 DIAGNOSIS — Z00.00 WELL ADULT EXAM: ICD-10-CM

## 2019-12-21 PROCEDURE — 82306 VITAMIN D 25 HYDROXY: CPT

## 2019-12-21 PROCEDURE — 85025 COMPLETE CBC W/AUTO DIFF WBC: CPT

## 2019-12-21 PROCEDURE — 84443 ASSAY THYROID STIM HORMONE: CPT

## 2019-12-21 PROCEDURE — 80048 BASIC METABOLIC PNL TOTAL CA: CPT

## 2019-12-21 PROCEDURE — 36415 COLL VENOUS BLD VENIPUNCTURE: CPT

## 2019-12-21 PROCEDURE — 80061 LIPID PANEL: CPT

## 2019-12-21 PROCEDURE — 84460 ALANINE AMINO (ALT) (SGPT): CPT

## 2019-12-21 PROCEDURE — 84450 TRANSFERASE (AST) (SGOT): CPT

## 2020-01-05 DIAGNOSIS — E78.00 HYPERCHOLESTEREMIA: Primary | ICD-10-CM

## 2020-01-05 RX ORDER — ATORVASTATIN CALCIUM 20 MG/1
20 TABLET, FILM COATED ORAL NIGHTLY
Qty: 90 TABLET | Refills: 3 | Status: SHIPPED | OUTPATIENT
Start: 2020-01-05 | End: 2020-12-12

## 2020-03-11 ENCOUNTER — NURSE TRIAGE (OUTPATIENT)
Dept: OTHER | Age: 55
End: 2020-03-11

## 2020-03-11 NOTE — TELEPHONE ENCOUNTER
Action Requested: Summary for Provider     []  Critical Lab, Recommendations Needed  [] Need Additional Advice  []   FYI    []   Need Orders  [] Need Medications Sent to Pharmacy  []  Other     SUMMARY: appt made, nausea x 2 days and fatigue    Reason for

## 2020-03-12 ENCOUNTER — OFFICE VISIT (OUTPATIENT)
Dept: FAMILY MEDICINE CLINIC | Facility: CLINIC | Age: 55
End: 2020-03-12
Payer: COMMERCIAL

## 2020-03-12 VITALS
HEIGHT: 61 IN | HEART RATE: 67 BPM | BODY MASS INDEX: 31.72 KG/M2 | TEMPERATURE: 98 F | SYSTOLIC BLOOD PRESSURE: 128 MMHG | WEIGHT: 168 LBS | DIASTOLIC BLOOD PRESSURE: 77 MMHG

## 2020-03-12 DIAGNOSIS — R11.0 NAUSEA: Primary | ICD-10-CM

## 2020-03-12 PROCEDURE — 99214 OFFICE O/P EST MOD 30 MIN: CPT | Performed by: FAMILY MEDICINE

## 2020-03-12 PROCEDURE — 99212 OFFICE O/P EST SF 10 MIN: CPT | Performed by: FAMILY MEDICINE

## 2020-03-12 RX ORDER — ONDANSETRON 4 MG/1
4 TABLET, FILM COATED ORAL EVERY 8 HOURS PRN
Qty: 30 TABLET | Refills: 0 | Status: SHIPPED | OUTPATIENT
Start: 2020-03-12 | End: 2020-11-17 | Stop reason: ALTCHOICE

## 2020-03-12 NOTE — PROGRESS NOTES
HPI:    Patient ID: Cr Mi is a 47year old female. Feeling fatigue for 3 weeks. nausepus 3 days  No vomiting, no diarrhea  No abdominal pain      Review of Systems   Constitutional: Positive for fatigue.  Negative for activity change, appetite de jesus Visit:  Requested Prescriptions      No prescriptions requested or ordered in this encounter       Imaging & Referrals:  None       #[id number]

## 2020-11-05 ENCOUNTER — NURSE TRIAGE (OUTPATIENT)
Dept: FAMILY MEDICINE CLINIC | Facility: CLINIC | Age: 55
End: 2020-11-05

## 2020-11-05 ENCOUNTER — HOSPITAL ENCOUNTER (EMERGENCY)
Facility: HOSPITAL | Age: 55
Discharge: HOME | End: 2020-11-05
Attending: EMERGENCY MEDICINE
Payer: COMMERCIAL

## 2020-11-05 ENCOUNTER — APPOINTMENT (OUTPATIENT)
Dept: CT IMAGING | Facility: HOSPITAL | Age: 55
End: 2020-11-05
Attending: EMERGENCY MEDICINE
Payer: COMMERCIAL

## 2020-11-05 VITALS
DIASTOLIC BLOOD PRESSURE: 80 MMHG | OXYGEN SATURATION: 100 % | HEIGHT: 61 IN | HEART RATE: 78 BPM | SYSTOLIC BLOOD PRESSURE: 160 MMHG | BODY MASS INDEX: 30.21 KG/M2 | TEMPERATURE: 98 F | WEIGHT: 160 LBS | RESPIRATION RATE: 18 BRPM

## 2020-11-05 DIAGNOSIS — S06.0X1A CONCUSSION WITH LOSS OF CONSCIOUSNESS OF 30 MINUTES OR LESS, INITIAL ENCOUNTER: Primary | ICD-10-CM

## 2020-11-05 DIAGNOSIS — Z12.31 SCREENING MAMMOGRAM, ENCOUNTER FOR: Primary | ICD-10-CM

## 2020-11-05 PROCEDURE — 70450 CT HEAD/BRAIN W/O DYE: CPT | Performed by: EMERGENCY MEDICINE

## 2020-11-05 PROCEDURE — 99284 EMERGENCY DEPT VISIT MOD MDM: CPT

## 2020-11-05 NOTE — TELEPHONE ENCOUNTER
Action Requested: Summary for Provider     []  Critical Lab, Recommendations Needed  [] Need Additional Advice  [x]   FYI    []   Need Orders  [] Need Medications Sent to Pharmacy  []  Other     SUMMARY: per patient she was in bed last night and began to e

## 2020-11-05 NOTE — ED INITIAL ASSESSMENT (HPI)
Patient states she hit her head on her nightstand at [facility] after she had a cramp to her leg. Gloria  on to her knee and hit her head, felt dizzy and nauseated at that time.  states patient had brief LOC.

## 2020-11-06 NOTE — TELEPHONE ENCOUNTER
Pt seen in the ER.    Clinical Impressions       Concussion with loss of consciousness of 30 minutes or less, initial encounter  Disposition       Discharge         ED/IC AVS (Printed 11/5/2020)        Follow-Ups: Follow up with Chun Anne MD ([facility]

## 2020-11-06 NOTE — TELEPHONE ENCOUNTER
Spoke with pt,  verified, pt stated she was seen in ER yesterday for head concussion, she had  CT of head done and was ok. She is looking for f/u appt. Virtual appt made. Pt also req an order for yearly mammo, order pended.    pls advise, thanks in

## 2020-11-07 NOTE — ED PROVIDER NOTES
Patient Seen in: [facility] AND [facility] Emergency Department    History   Patient presents with:  Head Neck Injury    Stated Complaint: hit head last night on table/ no loc     HPI    Patient here with complaint of head injury. Injury occurred last night.   H [11/05/20 1155]   BP (!) 179/79   Pulse 80   Resp 20   Temp 98.4 °F (36.9 °C)   Temp src Oral   SpO2 100 %   O2 Device None (Room air)       Current:/80   Pulse 78   Temp 98.4 °F (36.9 °C) (Oral)   Resp 18   Ht 154.9 cm (5' 1\")   Wt 72.6 kg   SpO2 1 Prescribed:  Discharge Medication List as of 11/5/2020  1:31 PM

## 2020-11-17 ENCOUNTER — OFFICE VISIT (OUTPATIENT)
Dept: FAMILY MEDICINE CLINIC | Facility: CLINIC | Age: 55
End: 2020-11-17
Payer: COMMERCIAL

## 2020-11-17 VITALS
SYSTOLIC BLOOD PRESSURE: 142 MMHG | DIASTOLIC BLOOD PRESSURE: 80 MMHG | WEIGHT: 165 LBS | HEIGHT: 61 IN | BODY MASS INDEX: 31.15 KG/M2 | TEMPERATURE: 97 F | HEART RATE: 79 BPM

## 2020-11-17 DIAGNOSIS — I10 ESSENTIAL HYPERTENSION: ICD-10-CM

## 2020-11-17 DIAGNOSIS — E66.9 OBESITY (BMI 30.0-34.9): ICD-10-CM

## 2020-11-17 DIAGNOSIS — F41.9 ANXIETY AND DEPRESSION: ICD-10-CM

## 2020-11-17 DIAGNOSIS — E78.00 HYPERCHOLESTEREMIA: ICD-10-CM

## 2020-11-17 DIAGNOSIS — Z00.00 WELL ADULT EXAM: Primary | ICD-10-CM

## 2020-11-17 DIAGNOSIS — Z01.419 ENCOUNTER FOR GYNECOLOGICAL EXAMINATION: ICD-10-CM

## 2020-11-17 DIAGNOSIS — S06.0X0D CONCUSSION WITHOUT LOSS OF CONSCIOUSNESS, SUBSEQUENT ENCOUNTER: ICD-10-CM

## 2020-11-17 DIAGNOSIS — F32.A ANXIETY AND DEPRESSION: ICD-10-CM

## 2020-11-17 PROBLEM — Z87.42 HISTORY OF ABNORMAL CERVICAL PAP SMEAR: Status: RESOLVED | Noted: 2018-02-06 | Resolved: 2020-11-17

## 2020-11-17 PROBLEM — Z78.0 MENOPAUSE: Status: RESOLVED | Noted: 2019-04-15 | Resolved: 2020-11-17

## 2020-11-17 PROBLEM — E66.3 OVERWEIGHT (BMI 25.0-29.9): Status: RESOLVED | Noted: 2018-12-12 | Resolved: 2020-11-17

## 2020-11-17 PROBLEM — E66.811 OBESITY (BMI 30.0-34.9): Status: ACTIVE | Noted: 2020-11-17

## 2020-11-17 PROCEDURE — 3077F SYST BP >= 140 MM HG: CPT | Performed by: FAMILY MEDICINE

## 2020-11-17 PROCEDURE — 3008F BODY MASS INDEX DOCD: CPT | Performed by: FAMILY MEDICINE

## 2020-11-17 PROCEDURE — 99396 PREV VISIT EST AGE 40-64: CPT | Performed by: FAMILY MEDICINE

## 2020-11-17 PROCEDURE — 3079F DIAST BP 80-89 MM HG: CPT | Performed by: FAMILY MEDICINE

## 2020-11-17 RX ORDER — LISINOPRIL 20 MG/1
20 TABLET ORAL
Qty: 90 TABLET | Refills: 3 | Status: SHIPPED | OUTPATIENT
Start: 2020-11-17 | End: 2021-12-08

## 2020-11-17 RX ORDER — HYDROCHLOROTHIAZIDE 25 MG/1
25 TABLET ORAL EVERY MORNING
Qty: 90 TABLET | Refills: 0 | Status: SHIPPED | OUTPATIENT
Start: 2020-11-17 | End: 2021-12-08

## 2020-11-17 NOTE — PROGRESS NOTES
HPI:    Patient ID: Lawson Chiu is a 54year old female. HPI  Patient presents with:  Routine Physical    Patient seen on November 5 emergency room with concussion after she hit her head on the table. CT scan done showed no acute renal process.   Stable sleep disturbance and suicidal ideas. The patient is not nervous/anxious.         /80   Pulse 79   Temp 96.7 °F (35.9 °C) (Temporal)   Ht 5' 1\" (1.549 m)   Wt 165 lb (74.8 kg)   BMI 31.18 kg/m²     Past Medical History:   Diagnosis Date   • Anxiety s file        Forced sexual activity: Not on file    Other Topics      Concerns:         Service: Not Asked        Blood Transfusions: Not Asked        Caffeine Concern: Yes          Coffee, 1 cup daily        Occupational Exposure: Not Asked with:  Routine Physical     Physical Exam   Constitutional: She is oriented to person, place, and time. She appears well-developed and well-nourished. HENT:   Head: Normocephalic and atraumatic.    Right Ear: External ear normal.   Left Ear: External ear flu shot  Tetanus booster every 10 years (Tdap/ Td)  Labs ordered/ or reviewed if done prior to appointment     Well adult exam  (primary encounter diagnosis)  Essential hypertension  Hypercholesteremia  Obesity (bmi 30.0-34. 9)  Encounter for gynecological Sig: Take 1 tablet (25 mg total) by mouth every morning. With glass of orange juice or banana   • lisinopril 20 MG Oral Tab 90 tablet 3     Sig: Take 1 tablet (20 mg total) by mouth once daily.        Imaging & Referrals:  None       #[id number]

## 2020-11-28 ENCOUNTER — LAB ENCOUNTER (OUTPATIENT)
Dept: LAB | Age: 55
End: 2020-11-28
Attending: FAMILY MEDICINE
Payer: COMMERCIAL

## 2020-11-28 DIAGNOSIS — Z00.00 WELL ADULT EXAM: ICD-10-CM

## 2020-11-28 PROCEDURE — 80061 LIPID PANEL: CPT

## 2020-11-28 PROCEDURE — 36415 COLL VENOUS BLD VENIPUNCTURE: CPT

## 2020-11-28 PROCEDURE — 84443 ASSAY THYROID STIM HORMONE: CPT

## 2020-11-28 PROCEDURE — 85025 COMPLETE CBC W/AUTO DIFF WBC: CPT

## 2020-11-28 PROCEDURE — 80053 COMPREHEN METABOLIC PANEL: CPT

## 2020-12-01 ENCOUNTER — HOSPITAL ENCOUNTER (OUTPATIENT)
Dept: MAMMOGRAPHY | Facility: HOSPITAL | Age: 55
Discharge: HOME | End: 2020-12-01
Attending: FAMILY MEDICINE
Payer: COMMERCIAL

## 2020-12-01 DIAGNOSIS — Z12.31 SCREENING MAMMOGRAM, ENCOUNTER FOR: ICD-10-CM

## 2020-12-01 PROCEDURE — 77067 SCR MAMMO BI INCL CAD: CPT | Performed by: FAMILY MEDICINE

## 2020-12-01 PROCEDURE — 77063 BREAST TOMOSYNTHESIS BI: CPT | Performed by: FAMILY MEDICINE

## 2020-12-12 DIAGNOSIS — E78.00 HYPERCHOLESTEREMIA: ICD-10-CM

## 2020-12-12 RX ORDER — ATORVASTATIN CALCIUM 20 MG/1
20 TABLET, FILM COATED ORAL NIGHTLY
Qty: 90 TABLET | Refills: 3 | Status: SHIPPED | OUTPATIENT
Start: 2020-12-12

## 2021-01-05 ENCOUNTER — HOSPITAL ENCOUNTER (EMERGENCY)
Facility: HOSPITAL | Age: 56
Discharge: HOME | End: 2021-01-05
Attending: EMERGENCY MEDICINE
Payer: COMMERCIAL

## 2021-01-05 VITALS
TEMPERATURE: 98 F | RESPIRATION RATE: 18 BRPM | BODY MASS INDEX: 31 KG/M2 | SYSTOLIC BLOOD PRESSURE: 153 MMHG | OXYGEN SATURATION: 100 % | DIASTOLIC BLOOD PRESSURE: 80 MMHG | WEIGHT: 165 LBS | HEART RATE: 61 BPM

## 2021-01-05 DIAGNOSIS — U07.1 COVID-19 VIRUS INFECTION: Primary | ICD-10-CM

## 2021-01-05 LAB
ANION GAP SERPL CALC-SCNC: 4 MMOL/L (ref 0–18)
BASOPHILS # BLD AUTO: 0.02 X10(3) UL (ref 0–0.2)
BASOPHILS NFR BLD AUTO: 0.4 %
BILIRUB UR QL: NEGATIVE
BUN BLD-MCNC: 12 MG/DL (ref 7–18)
BUN/CREAT SERPL: 13.3 (ref 10–20)
CALCIUM BLD-MCNC: 9.5 MG/DL (ref 8.5–10.1)
CHLORIDE SERPL-SCNC: 106 MMOL/L (ref 98–112)
CLARITY UR: CLEAR
CO2 SERPL-SCNC: 29 MMOL/L (ref 21–32)
COLOR UR: YELLOW
CREAT BLD-MCNC: 0.9 MG/DL
DEPRECATED RDW RBC AUTO: 43 FL (ref 35.1–46.3)
EOSINOPHIL # BLD AUTO: 0.01 X10(3) UL (ref 0–0.7)
EOSINOPHIL NFR BLD AUTO: 0.2 %
ERYTHROCYTE [DISTWIDTH] IN BLOOD BY AUTOMATED COUNT: 12.6 % (ref 11–15)
GLUCOSE BLD-MCNC: 104 MG/DL (ref 70–99)
GLUCOSE UR-MCNC: NEGATIVE MG/DL
HCT VFR BLD AUTO: 45.2 %
HGB BLD-MCNC: 14.9 G/DL
HGB UR QL STRIP.AUTO: NEGATIVE
IMM GRANULOCYTES # BLD AUTO: 0.01 X10(3) UL (ref 0–1)
IMM GRANULOCYTES NFR BLD: 0.2 %
KETONES UR-MCNC: NEGATIVE MG/DL
LYMPHOCYTES # BLD AUTO: 1.54 X10(3) UL (ref 1–4)
LYMPHOCYTES NFR BLD AUTO: 28.5 %
MCH RBC QN AUTO: 30.3 PG (ref 26–34)
MCHC RBC AUTO-ENTMCNC: 33 G/DL (ref 31–37)
MCV RBC AUTO: 92.1 FL
MONOCYTES # BLD AUTO: 0.39 X10(3) UL (ref 0.1–1)
MONOCYTES NFR BLD AUTO: 7.2 %
NEUTROPHILS # BLD AUTO: 3.44 X10 (3) UL (ref 1.5–7.7)
NEUTROPHILS # BLD AUTO: 3.44 X10(3) UL (ref 1.5–7.7)
NEUTROPHILS NFR BLD AUTO: 63.5 %
NITRITE UR QL STRIP.AUTO: NEGATIVE
OSMOLALITY SERPL CALC.SUM OF ELEC: 288 MOSM/KG (ref 275–295)
PH UR: 7 [PH] (ref 5–8)
PLATELET # BLD AUTO: 243 10(3)UL (ref 150–450)
POTASSIUM SERPL-SCNC: 4.8 MMOL/L (ref 3.5–5.1)
PROT UR-MCNC: NEGATIVE MG/DL
RBC # BLD AUTO: 4.91 X10(6)UL
RBC #/AREA URNS AUTO: 1 /HPF
SARS-COV-2 RNA RESP QL NAA+PROBE: DETECTED
SODIUM SERPL-SCNC: 139 MMOL/L (ref 136–145)
SP GR UR STRIP: 1 (ref 1–1.03)
UROBILINOGEN UR STRIP-ACNC: <2
WBC # BLD AUTO: 5.4 X10(3) UL (ref 4–11)
WBC #/AREA URNS AUTO: 3 /HPF

## 2021-01-05 PROCEDURE — 99283 EMERGENCY DEPT VISIT LOW MDM: CPT

## 2021-01-05 PROCEDURE — 85025 COMPLETE CBC W/AUTO DIFF WBC: CPT | Performed by: EMERGENCY MEDICINE

## 2021-01-05 PROCEDURE — 80048 BASIC METABOLIC PNL TOTAL CA: CPT | Performed by: EMERGENCY MEDICINE

## 2021-01-05 PROCEDURE — 81001 URINALYSIS AUTO W/SCOPE: CPT | Performed by: EMERGENCY MEDICINE

## 2021-01-05 PROCEDURE — 36415 COLL VENOUS BLD VENIPUNCTURE: CPT

## 2021-01-05 NOTE — ED PROVIDER NOTES
Patient Seen in: [facility] AND [facility] Emergency Department      History   Patient presents with:  Viral Syndrome    Stated Complaint: HA, chills, cough    HPI/Subjective:   HPI    Patient is a 80-year-old female with a history of hypertension hyperlipidemia normal.   Mouth/Throat: Oropharynx is clear and moist.   Eyes: Conjunctivae and EOM are normal. Pupils are equal, round, and reactive to light. Neck: Neck supple.    Cardiovascular: Normal rate, regular rhythm, normal heart sounds and intact distal pulses orders. RAINBOW DRAW BLUE   RAINBOW DRAW LAVENDER   RAINBOW DRAW LIGHT GREEN   RAINBOW DRAW GOLD   CBC W/ DIFFERENTIAL          Rapid Covid was positive I did discuss monoclonal antibody treatment with patient as she does meet criteria. She declines.   C

## 2021-01-18 ENCOUNTER — VIRTUAL PHONE E/M (OUTPATIENT)
Dept: FAMILY MEDICINE CLINIC | Facility: CLINIC | Age: 56
End: 2021-01-18
Payer: COMMERCIAL

## 2021-01-18 ENCOUNTER — TELEPHONE (OUTPATIENT)
Dept: FAMILY MEDICINE CLINIC | Facility: CLINIC | Age: 56
End: 2021-01-18

## 2021-01-18 VITALS — SYSTOLIC BLOOD PRESSURE: 135 MMHG | DIASTOLIC BLOOD PRESSURE: 70 MMHG

## 2021-01-18 DIAGNOSIS — R61 SWEATING INCREASE: ICD-10-CM

## 2021-01-18 DIAGNOSIS — R53.83 FATIGUE, UNSPECIFIED TYPE: ICD-10-CM

## 2021-01-18 DIAGNOSIS — U07.1 COVID-19 VIRUS INFECTION: Primary | ICD-10-CM

## 2021-01-18 PROCEDURE — 3078F DIAST BP <80 MM HG: CPT | Performed by: FAMILY MEDICINE

## 2021-01-18 PROCEDURE — 3075F SYST BP GE 130 - 139MM HG: CPT | Performed by: FAMILY MEDICINE

## 2021-01-18 PROCEDURE — G2252 BRIEF CHKIN BY MD/QHP, 11-20: HCPCS | Performed by: FAMILY MEDICINE

## 2021-01-18 NOTE — PROGRESS NOTES
Lisethkika Holguin  or legal guardian ,verbally consents to a Virtual/Telephone Check-In visit on 05/26/20. Patient has been referred to the [facility] website at [email or web address] to review the yearly Consent to Treat document.     Patient understands and accep in complete sentences, no increased work of breathing and very coherent and alert on the phone. Alert and oriented x 3  Patient was responding to questions appropriately. Patient did not sound short of breath.       ASSESSMENT/PLAN:   Covid-19 virus infec

## 2021-01-18 NOTE — TELEPHONE ENCOUNTER
Pt was had virtual visit this morning and is calling to give fax # to return to work. Pt said she is still not feeling well and will return to work on 1/25. Please fax note to Jo Nesbitt at [phone].  Please advise

## 2021-04-21 ENCOUNTER — TELEPHONE (OUTPATIENT)
Dept: FAMILY MEDICINE CLINIC | Facility: CLINIC | Age: 56
End: 2021-04-21

## 2021-04-21 NOTE — TELEPHONE ENCOUNTER
Last annual physical 11-  Last mammogram completed 12-1-2020. Result advised for 1 year follow up. No need to call.

## 2021-04-26 ENCOUNTER — IMMUNIZATION (OUTPATIENT)
Dept: LAB | Facility: HOSPITAL | Age: 56
End: 2021-04-26
Attending: HOSPITALIST
Payer: COMMERCIAL

## 2021-04-26 DIAGNOSIS — Z23 NEED FOR VACCINATION: Primary | ICD-10-CM

## 2021-04-26 PROCEDURE — 0011A SARSCOV2 VAC 100MCG/0.5ML IM: CPT

## 2021-05-24 ENCOUNTER — IMMUNIZATION (OUTPATIENT)
Dept: LAB | Facility: HOSPITAL | Age: 56
End: 2021-05-24
Attending: EMERGENCY MEDICINE
Payer: COMMERCIAL

## 2021-05-24 DIAGNOSIS — Z23 NEED FOR VACCINATION: Primary | ICD-10-CM

## 2021-05-24 PROCEDURE — 0012A SARSCOV2 VAC 100MCG/0.5ML IM: CPT

## 2021-07-26 ENCOUNTER — LAB ENCOUNTER (OUTPATIENT)
Dept: LAB | Facility: HOSPITAL | Age: 56
End: 2021-07-26
Attending: FAMILY MEDICINE
Payer: COMMERCIAL

## 2021-07-26 ENCOUNTER — NURSE TRIAGE (OUTPATIENT)
Dept: FAMILY MEDICINE CLINIC | Facility: CLINIC | Age: 56
End: 2021-07-26

## 2021-07-26 DIAGNOSIS — R09.89 RESPIRATORY SYMPTOMS: ICD-10-CM

## 2021-07-26 DIAGNOSIS — R09.89 RESPIRATORY SYMPTOMS: Primary | ICD-10-CM

## 2021-07-26 NOTE — TELEPHONE ENCOUNTER
Action Requested: Summary for Provider     []  Critical Lab, Recommendations Needed  [] Need Additional Advice  []   FYI    []   Need Orders  [] Need Medications Sent to Pharmacy  [x]  Other     SUMMARY: Verified name and .   Patient calling to report th

## 2021-07-27 ENCOUNTER — OFFICE VISIT (OUTPATIENT)
Dept: FAMILY MEDICINE CLINIC | Facility: CLINIC | Age: 56
End: 2021-07-27
Payer: COMMERCIAL

## 2021-07-27 VITALS
DIASTOLIC BLOOD PRESSURE: 73 MMHG | TEMPERATURE: 97 F | WEIGHT: 156 LBS | BODY MASS INDEX: 28.71 KG/M2 | RESPIRATION RATE: 18 BRPM | SYSTOLIC BLOOD PRESSURE: 143 MMHG | HEIGHT: 61.7 IN | HEART RATE: 70 BPM

## 2021-07-27 DIAGNOSIS — R05.9 COUGH: ICD-10-CM

## 2021-07-27 LAB — SARS-COV-2 RNA RESP QL NAA+PROBE: NOT DETECTED

## 2021-07-27 PROCEDURE — 3077F SYST BP >= 140 MM HG: CPT | Performed by: FAMILY MEDICINE

## 2021-07-27 PROCEDURE — 3078F DIAST BP <80 MM HG: CPT | Performed by: FAMILY MEDICINE

## 2021-07-27 PROCEDURE — 3008F BODY MASS INDEX DOCD: CPT | Performed by: FAMILY MEDICINE

## 2021-07-27 PROCEDURE — 99213 OFFICE O/P EST LOW 20 MIN: CPT | Performed by: FAMILY MEDICINE

## 2021-07-27 RX ORDER — AZITHROMYCIN 250 MG/1
TABLET, FILM COATED ORAL
Qty: 6 TABLET | Refills: 0 | Status: SHIPPED | OUTPATIENT
Start: 2021-07-27 | End: 2021-08-01

## 2021-07-27 NOTE — PROGRESS NOTES
HPI/Subjective:   Patient ID: Cr Mi is a 64year old female. Pt presents with cold symptoms for 4-5 days. Pt has had cough, sore throat, chills and body aches. No fevers. Had COVID testing which was negative.  Has had COVID in past and has had COVI Breath sounds: Normal breath sounds. No wheezing or rales. Musculoskeletal:      Cervical back: Neck supple. Lymphadenopathy:      Cervical: No cervical adenopathy.          Assessment & Plan:   Cough with negative COVID testing:  - After discussion wit

## 2021-12-08 ENCOUNTER — OFFICE VISIT (OUTPATIENT)
Dept: FAMILY MEDICINE CLINIC | Facility: CLINIC | Age: 56
End: 2021-12-08
Payer: COMMERCIAL

## 2021-12-08 VITALS
SYSTOLIC BLOOD PRESSURE: 155 MMHG | BODY MASS INDEX: 29.45 KG/M2 | HEART RATE: 56 BPM | HEIGHT: 61 IN | TEMPERATURE: 98 F | DIASTOLIC BLOOD PRESSURE: 68 MMHG | WEIGHT: 156 LBS

## 2021-12-08 DIAGNOSIS — F41.9 ANXIETY AND DEPRESSION: ICD-10-CM

## 2021-12-08 DIAGNOSIS — Z12.11 COLON CANCER SCREENING: ICD-10-CM

## 2021-12-08 DIAGNOSIS — Z12.31 ENCOUNTER FOR SCREENING MAMMOGRAM FOR BREAST CANCER: ICD-10-CM

## 2021-12-08 DIAGNOSIS — Z00.00 WELL ADULT EXAM: Primary | ICD-10-CM

## 2021-12-08 DIAGNOSIS — I10 ESSENTIAL HYPERTENSION: ICD-10-CM

## 2021-12-08 DIAGNOSIS — F32.A ANXIETY AND DEPRESSION: ICD-10-CM

## 2021-12-08 DIAGNOSIS — E66.3 OVERWEIGHT (BMI 25.0-29.9): ICD-10-CM

## 2021-12-08 DIAGNOSIS — E78.00 HYPERCHOLESTEREMIA: ICD-10-CM

## 2021-12-08 DIAGNOSIS — Z01.419 ENCOUNTER FOR GYNECOLOGICAL EXAMINATION: ICD-10-CM

## 2021-12-08 DIAGNOSIS — Z80.0 FH: COLON CANCER IN RELATIVE <50 YEARS OLD: ICD-10-CM

## 2021-12-08 PROCEDURE — 3077F SYST BP >= 140 MM HG: CPT | Performed by: FAMILY MEDICINE

## 2021-12-08 PROCEDURE — 90686 IIV4 VACC NO PRSV 0.5 ML IM: CPT | Performed by: FAMILY MEDICINE

## 2021-12-08 PROCEDURE — 99396 PREV VISIT EST AGE 40-64: CPT | Performed by: FAMILY MEDICINE

## 2021-12-08 PROCEDURE — 90471 IMMUNIZATION ADMIN: CPT | Performed by: FAMILY MEDICINE

## 2021-12-08 PROCEDURE — 3008F BODY MASS INDEX DOCD: CPT | Performed by: FAMILY MEDICINE

## 2021-12-08 PROCEDURE — 3078F DIAST BP <80 MM HG: CPT | Performed by: FAMILY MEDICINE

## 2021-12-08 RX ORDER — LISINOPRIL 20 MG/1
20 TABLET ORAL
Qty: 90 TABLET | Refills: 3 | Status: SHIPPED | OUTPATIENT
Start: 2021-12-08

## 2021-12-08 RX ORDER — ESCITALOPRAM OXALATE 10 MG/1
10 TABLET ORAL DAILY
Qty: 90 TABLET | Refills: 0 | Status: SHIPPED | OUTPATIENT
Start: 2021-12-08

## 2021-12-08 RX ORDER — HYDROCHLOROTHIAZIDE 25 MG/1
25 TABLET ORAL EVERY MORNING
Qty: 90 TABLET | Refills: 3 | Status: SHIPPED | OUTPATIENT
Start: 2021-12-08

## 2021-12-08 NOTE — PROGRESS NOTES
HPI:    Patient ID: Cassie Alvarez is a 64year old female.     HPI  Patient presents with:  Routine Physical    Wt Readings from Last 6 Encounters:  12/08/21 : 156 lb (70.8 kg)  07/27/21 : 156 lb (70.8 kg)  01/05/21 : 165 lb (74.8 kg)  11/17/20 : 165 lb (74.8 Past Medical History:   Diagnosis Date   • Anxiety state, unspecified    • Bradycardia    • Hyperlipidemia    • Screening for lipoid disorders 3/7/2015    lipid panel   • Unspecified essential hypertension      Past Surgical History:   Procedure Jenn Gunn Age of Onset   • Hypertension Mother    • Lipids Mother    • Colon Cancer Father    • Colon Cancer Brother 50   • Other (testicular cancer) Brother 50   • Diabetes Neg    • Heart Disorder Neg        Immunization History   Administered Date(s) Administered Right eye: No discharge. Left eye: No discharge. Conjunctiva/sclera: Conjunctivae normal.      Pupils: Pupils are equal, round, and reactive to light. Neck:      Thyroid: No thyromegaly.    Cardiovascular:      Rate and Rhythm: Normal rate PANEL (14); Future  - LIPID PANEL; Future  - TSH W REFLEX TO FREE T4; Future    2. Essential hypertension  Elevated today but patient states she has been stressed with her family  - hydroCHLOROthiazide 25 MG Oral Tab;  Take 1 tablet (25 mg total) by mouth e QUAD PRESERVATIVE FREE 0.5 ML  GASTRO - INTERNAL  BEBE ALEC 2D+3D SCREENING BILAT (CPT=77067/78424)       CP#3008

## 2021-12-21 ENCOUNTER — LAB ENCOUNTER (OUTPATIENT)
Dept: LAB | Age: 56
End: 2021-12-21
Attending: FAMILY MEDICINE
Payer: COMMERCIAL

## 2021-12-21 ENCOUNTER — HOSPITAL ENCOUNTER (OUTPATIENT)
Dept: MAMMOGRAPHY | Facility: HOSPITAL | Age: 56
Discharge: HOME | End: 2021-12-21
Attending: FAMILY MEDICINE
Payer: COMMERCIAL

## 2021-12-21 DIAGNOSIS — Z12.31 ENCOUNTER FOR SCREENING MAMMOGRAM FOR BREAST CANCER: ICD-10-CM

## 2021-12-21 DIAGNOSIS — Z00.00 WELL ADULT EXAM: ICD-10-CM

## 2021-12-21 PROCEDURE — 77063 BREAST TOMOSYNTHESIS BI: CPT | Performed by: FAMILY MEDICINE

## 2021-12-21 PROCEDURE — 77067 SCR MAMMO BI INCL CAD: CPT | Performed by: FAMILY MEDICINE

## 2021-12-21 PROCEDURE — 85025 COMPLETE CBC W/AUTO DIFF WBC: CPT

## 2021-12-21 PROCEDURE — 80053 COMPREHEN METABOLIC PANEL: CPT

## 2021-12-21 PROCEDURE — 84443 ASSAY THYROID STIM HORMONE: CPT

## 2021-12-21 PROCEDURE — 36415 COLL VENOUS BLD VENIPUNCTURE: CPT

## 2021-12-21 PROCEDURE — 80061 LIPID PANEL: CPT

## 2022-02-14 ENCOUNTER — TELEPHONE (OUTPATIENT)
Dept: FAMILY MEDICINE CLINIC | Facility: CLINIC | Age: 57
End: 2022-02-14

## 2022-02-16 ENCOUNTER — TELEPHONE (OUTPATIENT)
Dept: HEMATOLOGY/ONCOLOGY | Facility: HOSPITAL | Age: 57
End: 2022-02-16

## 2022-03-01 ENCOUNTER — APPOINTMENT (OUTPATIENT)
Dept: GENETICS | Facility: HOSPITAL | Age: 57
End: 2022-03-01
Payer: COMMERCIAL

## 2022-03-01 ENCOUNTER — APPOINTMENT (OUTPATIENT)
Dept: HEMATOLOGY/ONCOLOGY | Facility: HOSPITAL | Age: 57
End: 2022-03-01
Payer: COMMERCIAL

## 2022-03-03 ENCOUNTER — GENETICS ENCOUNTER (OUTPATIENT)
Dept: GENETICS | Facility: HOSPITAL | Age: 57
End: 2022-03-03
Payer: COMMERCIAL

## 2022-03-03 ENCOUNTER — NURSE ONLY (OUTPATIENT)
Dept: HEMATOLOGY/ONCOLOGY | Facility: HOSPITAL | Age: 57
End: 2022-03-03
Payer: COMMERCIAL

## 2022-03-03 ENCOUNTER — APPOINTMENT (OUTPATIENT)
Dept: HEMATOLOGY/ONCOLOGY | Facility: HOSPITAL | Age: 57
End: 2022-03-03
Payer: COMMERCIAL

## 2022-03-03 ENCOUNTER — APPOINTMENT (OUTPATIENT)
Dept: GENETICS | Facility: HOSPITAL | Age: 57
End: 2022-03-03
Payer: COMMERCIAL

## 2022-03-03 DIAGNOSIS — Z80.0 FAMILY HISTORY OF COLON CANCER: ICD-10-CM

## 2022-03-03 DIAGNOSIS — Z80.9 FAMILY HISTORY OF CANCER: Primary | ICD-10-CM

## 2022-03-03 PROCEDURE — 36415 COLL VENOUS BLD VENIPUNCTURE: CPT

## 2022-03-03 PROCEDURE — 96040 HC GENETIC COUNSELING EA 30 MIN: CPT

## 2022-03-03 NOTE — PROGRESS NOTES
Patient Name: Mercedes Brown  YOB: 1965  Date of Visit: 3/3/2022    Reason for visit: Ms. Chanel Dickerson was seen for the purposes of genetic counseling due to a family history of colorectal cancer. Referring Provider: Irene Benavidez    Medical History: Ms. Chanel Dickerson is a pleasant and generally healthy 64year old female presenting with no personal history of cancers. She retains her uterus, ovaries, and fallopian tubes. Ms. Nando Obregon last mammogram was in 21 and was normal (birads B density), with no h/o breast bxs. Her last pap/pelvic exam was in 2019 and was normal. She has had a colonoscopy in 6/15/2018 with no polyps. Ms. Chanel Dickerson achieved menarche at approximately 15years of age, was post-menopausal at [facility], and has been pregnant 5 times, delivering her first of 4 children at 19y. Ms. Chanel Dickerson has a 0-year history of oral contraceptive use and denies any fertility or hormone replacement use. Relevant Family History: Ms. Chanel Dickerson has 2 daughters (1  at 2y due to complications following cardiac surgery for aortic stenosis (SVAS?), she was reportedly dx with Nez Perce Zack syndrome; 1 is living at [address] [place] [address] with no cancer history who was reportedly found to be a possible silent carrier of SMA on routine carrier screening, she has 1 daughter (7y) with a h/o seizures) and 2 sons (25y, 23y; who are healthy). She has 1 sister (46y with no cancers) and 4 brothers (fraternal twins; 52y with a h/o colon cancer dx ~45y with no known genetic testing done; 39y with a h/o testicular cancer dx ~44y born with spina bifida; ~60y with no cancers; ~57y with no cancers). Her mother (late-70s) has no cancer history. She has 3 maternal uncles (1 living >50y; 2  >50y), all with no cancers. Her maternal grandparents both  in their 66-80s with no cancers. Ms. Nando Obregon father (~17H) was dx with colorectal cancer ~80y.  None of her paternal aunts or uncles (unknown specific number) or their children are reported to have had cancer. Her paternal grandparents both  >53y dt unknown causes with no reported cancers. The rest of the family history is negative for other significant genetic conditions, cancers, or birth defects of any kind. See scanned pedigree for full family history reported during the session. Ms. James Valladares maternal and paternal ethnicity is . She is unaware of any Ashkenazi Sabianism heritage. Summary:   Regarding the reported family history of spinal muscular atrophy (SMA) carrier status  Ms. Randon Lefort reports that her daughter had carrier screening to help her and her partner be aware of possibly elevated risks to have a child with certain genetic conditions. Ms. Randon Lefort stated her daughter was found to be a possible silent carrier of SMA. No report was available to review and Ms. Randon Lefort did not know if her daughter's reproductive partner had carrier screening too. Spinal muscular atrophy (SMA) is one of the most common autosomal recessive diseases with an incidence of about 1 in 10,000 livebirths and a carrier frequency of 1 in 28 to 1 in 80, depending on ethnicity. SMA carrier screening employs dosage sensitive methods that determine SMN1 (the causative gene for SMA) copy number; however, these methods are limited by their inability to identify silent (2+0) carriers, with two copies (duplication) of SMN1 on one chromosome 5 and deletion on the other. Enhanced SMA carrier screening is typically employed and involves testing for a single polymorphism in intron 7 of SMN1, c.*3+80T>G (aka: [id number]), which is part of a haplotype specific for SMN1 duplication alleles in the Ashkenazi Sabianism and  populations and is significantly enriched in individuals with SMN1 duplications in the [place],  and  populations. Testing for this polymorphism in possible silent carriers may help to further refine estimates for carrier status.  Ultimately, we discussed that testing of her daughter's reproductive partner is indicated first to help further refine her daughter and her daughter's partner's risk to have a child with SMA. Other family members of reproductive age are encouraged to discuss carrier screening with their healthcare providers, ideally prior to pregnancy. Regarding the notable family history of colon cancer  In the [place], approximately 1 in 13 (6%) of individuals will develop colorectal cancer. The vast majority, 65-85%, of colorectal cancer cases are sporadic. Risk factors for colorectal cancer, excluding family history, include age of 48 years or older, obesity, high intake of alcohol, smoking, eating a diet high in fat and red meat and low in fiber, a history of colorectal polyps, and chronic bowel disease (e.g., Crohn's disease, IBS, ulcerative colitis). Approximately 10-30% of cases of colorectal cancer cases are attributed to familial factors. Individuals with an affected parent, sibling or child are themselves at increased risk to develop colorectal cancer. This increased risk may be due to unidentified low penetrance genes in the family or shared environmental factors. Approximately 5-7% of colorectal cancer cases are hereditary. Signs of a hereditary cancer syndrome include some rare cancers, common cancers occurring at unusually young ages, multiple primary cancers in the some individual, or the same type of cancer or related cancers (e.g., breast and ovarian, colorectal and endometrial) in three or more individuals in the same lineage. The most common hereditary colorectal cancer syndrome is Palmer syndrome (a.k.a., hereditary nonpolyposis colorectal cancer or HNPCC) which accounts for approximately 3% of all colorectal cancer cases. Other less common syndromes include familial adenomatous polyposis (FAP), attenuated FAP (AFAP), MYH-associated polyposis (MAP), and Peutz-Jeghers syndrome (PJS).  Direct sequence and/or rearrangement analysis of genes associated with hereditary cancers is available. When a personal and family history is not suggestive of a single hereditary cancer syndrome, a panel test evaluating multiple genes associated with hereditary cancers is often recommended. If testing is performed, three results are possible: positive, negative, and variant of uncertain significance. A positive result indicates a mutation has been identified, and there is an increased risk for the cancers associated with the specific gene. Since mutations in most cancer susceptibility genes are inherited in an autosomal dominant fashion, siblings and children of individuals with a mutation have a 50% risk of carrying the mutation as well. A negative test result would indicate that no mutation was identified in a cancer susceptibility gene. While testing detects gene mutations, it is possible for a mutation to be present and go undetected. It is also possible for a mutation to be located in a gene other than those being tested. A variant of uncertain significance means that a change has been identified a cancer susceptibility gene; however, it is uncertain if the variant is pathogenic or a non-deleterious change. With time, the variant may be reclassified as either positive or negative. Since mutation identification is necessary, an affected family member is preferred in order to confirm that a mutation is indeed present in a particular family. If the mutation can be identified in an affected individual, this information can be used to screen other at-risk individuals in the family. Individuals who are positive for the same mutation would be expected to have a much greater risk for developing hereditary cancer during their lifetime than the general population and surveillance and management would be rigorous.   For those individuals who are found to not carry the mutation, risks for developing cancer during their lifetime would return to those expected for individuals in the general population. It should be emphasized that absence of a mutation in an at-risk individual would not eliminate their risk for cancer, but simply return them to the risk expected for the general population. If no affected individual is available for testing, a negative result, while reassuring, cannot be completely informative of the familial cancer risk as it is unknown whether no mutation was found because the individual tested is truly negative for the familial mutation or whether the familial mutation was unable to be detected by the genetic testing method used. In such cases, screening and follow-up should be guided by personal and family history. Because Ms. Sukhjinder Deal brother and father were diagnosed with colorectal cancer at ~45y and ~80y, respectively, genetic testing for high-penetrance colorectal cancer susceptibility genes is indicated based on the NCCN guidelines Sammie Jones V.1.2021). Ms. Phong Simmons appeared to understand the information presented. On the day of the visit Ms. Phong Simmons elected to proceed with genetic testing for hereditary colorectal cancer and the colorectal cancer panel was ordered. Blood was drawn and sent to [facility] with an estimated turn-around-time of 2-3 weeks. My office will call Ms. Phong Simmons as soon as results are received; post-test counseling can be scheduled at that time. Thank you for allowing me to participate in the care of your patient; please do not hesitate to contact my office if you have any questions or concerns, [phone]. Plan:   1. Blood was drawn and sent out for [employer]'s colorectal cancer panel (30 genes; TAT: 2-3 weeks)   2. The Genetics office will call Ms. Phong Simmons when results are available. 3. I encouraged Ms. Phong Simmons to get more information from her daughter about her previously done carrier screening and her daughter's partner's carrier status. 4. Recommendations for Ms. Phong Simmons and family members will depend the above genetic testing results.       Send to: Chevy Sommers  Time spent with patient: 55 minutes

## 2022-03-08 ENCOUNTER — APPOINTMENT (OUTPATIENT)
Dept: GENETICS | Facility: HOSPITAL | Age: 57
End: 2022-03-08
Attending: GENETIC COUNSELOR, MS
Payer: COMMERCIAL

## 2022-03-08 ENCOUNTER — APPOINTMENT (OUTPATIENT)
Dept: HEMATOLOGY/ONCOLOGY | Facility: HOSPITAL | Age: 57
End: 2022-03-08
Attending: GENETIC COUNSELOR, MS
Payer: COMMERCIAL

## 2022-03-14 ENCOUNTER — GENETICS ENCOUNTER (OUTPATIENT)
Dept: HEMATOLOGY/ONCOLOGY | Facility: HOSPITAL | Age: 57
End: 2022-03-14

## 2022-03-14 ENCOUNTER — TELEPHONE (OUTPATIENT)
Dept: HEMATOLOGY/ONCOLOGY | Facility: HOSPITAL | Age: 57
End: 2022-03-14

## 2022-03-14 NOTE — PROGRESS NOTES
Patient Name: Millie Yao  YOB: 1965    Referring Provider:  Ángel Brenner MD     Reason for Referral:  Ms. Daly Parish had genetic testing performed on 3/3/2022 because of a family history of colorectal cancer. Genetic Testing Result:  Negative. No pathogenic variant was found in the following 30 genes on the [facility] colorectal cancer panel: APC*, REMINGTON*, AXIN2, BLM, BMPR1A, BUB1B, CDH1, CEP57*, CHEK2, ENG*, EPCAM*, FLCN, GALNT12, GREM1*, MLH1*, MLH3*, MSH2*, MSH3*, MSH6*, MUTYH, NTHL1, PMS2*, POLD1*, POLE, PTEN*, RNF43, RPS20, SMAD4, STK11, TP53. Please refer to the report from [facility] for additional testing information. These results were discussed with Ms. Daly Parish via telephone on 3/14/2022. Summary and Plan:   These results indicate it is unlikely that Ms. Daly Parish has a pathogenic variant (harmful genetic mutation) in any of the genes listed above. No pathogenic variants associated with hereditary colorectal cancer syndromes were identified. The etiology Ms. Couch's family history of cancer remains unexplained. Management and surveillance for Ms. Daly Parish and other family members should be based on their personal and family history, with screening for cancers beginning 10 years younger than the earliest age at diagnosis if it would push screening to start at an earlier age than recommended for the general population. Due to her family history of colorectal cancer in two first-degree relatives, in her brother and her father, Ms. Daly Parish would be recommended to have a colonoscopy every 5y, or more frequently per colonoscopy findings (NCCN Colon Cancer Screening V.2.2021). All medical management decisions should be made with a physician. The limitations of the testing were discussed with Ms. Daly Parish including the chance that a pathogenic variant in a gene other than those included in this analysis might be the cause of cancer in Ms. Daly Parish or in relatives.      I encouraged Ms. Daly Parish to share the genetic test results with her children and other relatives so that they may discuss the implications of this information with their health providers. Ms. Hiro Husain is also encouraged to contact me on an annual basis to learn if there have been any updates in genetic testing that would apply or if there are changes in the personal and/or family history. Please do not hesitate to contact my office if you have any questions or concerns, [phone].      Miriam Lizama MS, CGC

## 2022-03-14 NOTE — TELEPHONE ENCOUNTER
Dagoberto Prescott called back to share additional information about her daughter's and her daughter's partner's SMA carrier status. She was able to confirm that her daughter's partner was found to be a carrier of SMA and her daughter was found to be a possible silent carrier. I discussed that in this case parental testing may be helpful to possibly clarify her daughter's currently uncertain carrier status. Dagoberto Prescott stated that she will be asking her daughter to email/fax over information about the previous carrier screening results. In the meantime I will have the schedulers reach out to Pina to schedule her a genetics blood draw at the [facility] cancer center to facilitate SMA testing (single gene SMN1). I also shared her hereditary cancer testing results with her at the same time, see genetics encounter with results.

## 2022-03-15 ENCOUNTER — TELEPHONE (OUTPATIENT)
Dept: HEMATOLOGY/ONCOLOGY | Facility: HOSPITAL | Age: 57
End: 2022-03-15

## 2022-03-17 ENCOUNTER — NURSE ONLY (OUTPATIENT)
Dept: HEMATOLOGY/ONCOLOGY | Facility: HOSPITAL | Age: 57
End: 2022-03-17
Payer: COMMERCIAL

## 2022-03-17 PROCEDURE — 36415 COLL VENOUS BLD VENIPUNCTURE: CPT

## 2022-03-28 ENCOUNTER — GENETICS ENCOUNTER (OUTPATIENT)
Dept: HEMATOLOGY/ONCOLOGY | Facility: HOSPITAL | Age: 57
End: 2022-03-28

## 2022-03-28 ENCOUNTER — TELEPHONE (OUTPATIENT)
Dept: HEMATOLOGY/ONCOLOGY | Facility: HOSPITAL | Age: 57
End: 2022-03-28

## 2022-03-28 NOTE — PROGRESS NOTES
Patient Name: Imelda Quiñones  YOB: 1965    Referring Provider:  Kiana Valenzuela MD         Reason for Referral:  Ms. Seda Pascual had genetic testing performed on 3/17/2022 to help clarify her daughter's possible SMA carrier status. Genetic Testing Result:  Negative for carrier status. No pathogenic variant was found in the following 1 gene: SMN1. Three copies of SMN1 were detected. Ms. Seda Pascual was not found to be a carrier of spinal muscular atrophy (SMA). Please refer to the report from [facility] for additional testing information. Summary and Plan:   These results indicate that it is unlikely that Ms. Seda Pascual is a carrier of SMA. The limitations of the testing were discussed with Ms. Seda Pascual including the chance, even with a negative result, that she could still be a carrier of SMA, this is called the residual risk. The residual risk that Ms. Seda Pascual is a carrier of spinal muscular atrophy it is greatly reduced at <1%. I encouraged Ms. Seda Pascual to share the genetic test results with her family members so that they may discuss the implications of this information with their health providers. Please do not hesitate to contact my office if you have any questions or concerns, [phone].        Abdi Landon MS, CGC

## 2022-03-30 ENCOUNTER — PATIENT MESSAGE (OUTPATIENT)
Dept: HEMATOLOGY/ONCOLOGY | Facility: HOSPITAL | Age: 57
End: 2022-03-30

## 2022-03-30 NOTE — TELEPHONE ENCOUNTER
LVM #2 asking Pina to CB to discuss her carrier screening results. I will send her a [employer] message summarizing her results and will be mailing them to her home address also at this time.

## 2022-03-31 NOTE — TELEPHONE ENCOUNTER
Pina called back. I reviewed her results with her. I informed her that she has access to the written results summary in [facility] and that a hard copy of her results and results letter were mailed to her.

## 2022-04-01 ENCOUNTER — OFFICE VISIT (OUTPATIENT)
Dept: FAMILY MEDICINE CLINIC | Facility: CLINIC | Age: 57
End: 2022-04-01
Payer: COMMERCIAL

## 2022-04-01 VITALS
WEIGHT: 155 LBS | HEIGHT: 61 IN | BODY MASS INDEX: 29.27 KG/M2 | SYSTOLIC BLOOD PRESSURE: 166 MMHG | HEART RATE: 65 BPM | DIASTOLIC BLOOD PRESSURE: 87 MMHG

## 2022-04-01 DIAGNOSIS — R42 VERTIGO: ICD-10-CM

## 2022-04-01 DIAGNOSIS — H53.9 VISION CHANGES: ICD-10-CM

## 2022-04-01 DIAGNOSIS — I10 ESSENTIAL HYPERTENSION: Primary | ICD-10-CM

## 2022-04-01 PROCEDURE — 3077F SYST BP >= 140 MM HG: CPT | Performed by: FAMILY MEDICINE

## 2022-04-01 PROCEDURE — 3008F BODY MASS INDEX DOCD: CPT | Performed by: FAMILY MEDICINE

## 2022-04-01 PROCEDURE — 3079F DIAST BP 80-89 MM HG: CPT | Performed by: FAMILY MEDICINE

## 2022-04-01 PROCEDURE — 99213 OFFICE O/P EST LOW 20 MIN: CPT | Performed by: FAMILY MEDICINE

## 2022-04-01 RX ORDER — MECLIZINE HYDROCHLORIDE 25 MG/1
25 TABLET ORAL 3 TIMES DAILY PRN
Qty: 20 TABLET | Refills: 1 | Status: SHIPPED | OUTPATIENT
Start: 2022-04-01

## 2022-04-18 ENCOUNTER — APPOINTMENT (OUTPATIENT)
Dept: GENERAL RADIOLOGY | Facility: HOSPITAL | Age: 57
End: 2022-04-18
Attending: EMERGENCY MEDICINE
Payer: COMMERCIAL

## 2022-04-18 ENCOUNTER — HOSPITAL ENCOUNTER (EMERGENCY)
Facility: HOSPITAL | Age: 57
Discharge: HOME | End: 2022-04-18
Attending: EMERGENCY MEDICINE
Payer: COMMERCIAL

## 2022-04-18 VITALS
DIASTOLIC BLOOD PRESSURE: 84 MMHG | RESPIRATION RATE: 22 BRPM | OXYGEN SATURATION: 100 % | SYSTOLIC BLOOD PRESSURE: 166 MMHG | WEIGHT: 160 LBS | BODY MASS INDEX: 30.21 KG/M2 | HEIGHT: 61 IN | HEART RATE: 67 BPM | TEMPERATURE: 98 F

## 2022-04-18 DIAGNOSIS — S39.92XA LOWER BACK INJURY, INITIAL ENCOUNTER: Primary | ICD-10-CM

## 2022-04-18 PROCEDURE — 96372 THER/PROPH/DIAG INJ SC/IM: CPT

## 2022-04-18 PROCEDURE — 99284 EMERGENCY DEPT VISIT MOD MDM: CPT

## 2022-04-18 PROCEDURE — 72100 X-RAY EXAM L-S SPINE 2/3 VWS: CPT | Performed by: EMERGENCY MEDICINE

## 2022-04-18 RX ORDER — ORPHENADRINE CITRATE 100 MG/1
100 TABLET, EXTENDED RELEASE ORAL 2 TIMES DAILY
Qty: 20 TABLET | Refills: 0 | Status: SHIPPED | OUTPATIENT
Start: 2022-04-18 | End: 2022-04-28

## 2022-04-18 RX ORDER — KETOROLAC TROMETHAMINE 10 MG/1
10 TABLET, FILM COATED ORAL EVERY 6 HOURS PRN
Qty: 20 TABLET | Refills: 0 | Status: SHIPPED | OUTPATIENT
Start: 2022-04-18 | End: 2022-04-23

## 2022-04-18 RX ORDER — ORPHENADRINE CITRATE 30 MG/ML
30 INJECTION INTRAMUSCULAR; INTRAVENOUS ONCE
Status: COMPLETED | OUTPATIENT
Start: 2022-04-18 | End: 2022-04-18

## 2022-04-18 RX ORDER — KETOROLAC TROMETHAMINE 30 MG/ML
30 INJECTION, SOLUTION INTRAMUSCULAR; INTRAVENOUS ONCE
Status: COMPLETED | OUTPATIENT
Start: 2022-04-18 | End: 2022-04-18

## 2022-04-18 NOTE — ED INITIAL ASSESSMENT (HPI)
Pt from home with complaint of pain to lower back after bending down and hearing a \"snap\". Pt reports one episode of vomiting after incident.

## 2022-05-12 RX ORDER — ATORVASTATIN CALCIUM 20 MG/1
TABLET, FILM COATED ORAL
Qty: 90 TABLET | Refills: 1 | Status: SHIPPED | OUTPATIENT
Start: 2022-05-12

## 2022-07-12 ENCOUNTER — IMMUNIZATION (OUTPATIENT)
Dept: LAB | Age: 57
End: 2022-07-12
Attending: EMERGENCY MEDICINE
Payer: COMMERCIAL

## 2022-07-12 DIAGNOSIS — Z23 NEED FOR VACCINATION: Primary | ICD-10-CM

## 2022-07-12 PROCEDURE — 0094A SARSCOV2 VAC 50MCG/0.5ML IM: CPT

## 2023-03-28 ENCOUNTER — TELEPHONE (OUTPATIENT)
Dept: FAMILY MEDICINE CLINIC | Facility: CLINIC | Age: 58
End: 2023-03-28

## 2023-05-02 ENCOUNTER — OFFICE VISIT (OUTPATIENT)
Dept: FAMILY MEDICINE CLINIC | Facility: CLINIC | Age: 58
End: 2023-05-02

## 2023-05-02 VITALS
HEART RATE: 87 BPM | OXYGEN SATURATION: 98 % | TEMPERATURE: 99 F | DIASTOLIC BLOOD PRESSURE: 72 MMHG | BODY MASS INDEX: 29.61 KG/M2 | SYSTOLIC BLOOD PRESSURE: 138 MMHG | WEIGHT: 156.81 LBS | HEIGHT: 61 IN

## 2023-05-02 DIAGNOSIS — J02.9 SORE THROAT: ICD-10-CM

## 2023-05-02 DIAGNOSIS — R05.1 ACUTE COUGH: ICD-10-CM

## 2023-05-02 DIAGNOSIS — Z20.822 SUSPECTED COVID-19 VIRUS INFECTION: Primary | ICD-10-CM

## 2023-05-02 PROBLEM — R10.13 ABDOMINAL PAIN, EPIGASTRIC: Status: ACTIVE | Noted: 2018-03-02

## 2023-05-02 PROBLEM — F41.9 ANXIETY: Status: ACTIVE | Noted: 2022-04-14

## 2023-05-02 PROBLEM — R00.2 PALPITATIONS: Status: ACTIVE | Noted: 2022-04-14

## 2023-05-02 PROBLEM — K80.10 CALCULUS OF GALLBLADDER WITH CHRONIC CHOLECYSTITIS WITHOUT OBSTRUCTION: Status: ACTIVE | Noted: 2023-05-02

## 2023-05-02 LAB
CONTROL LINE PRESENT WITH A CLEAR BACKGROUND (YES/NO): YES YES/NO
COVID19 BINAX NOW ANTIGEN: NOT DETECTED
KIT LOT #: NORMAL NUMERIC
OPERATOR ID: NORMAL
STREP GRP A CUL-SCR: NEGATIVE

## 2023-05-02 PROCEDURE — 3075F SYST BP GE 130 - 139MM HG: CPT

## 2023-05-02 PROCEDURE — 3008F BODY MASS INDEX DOCD: CPT

## 2023-05-02 PROCEDURE — 99213 OFFICE O/P EST LOW 20 MIN: CPT

## 2023-05-02 PROCEDURE — 3078F DIAST BP <80 MM HG: CPT

## 2023-05-02 PROCEDURE — 87880 STREP A ASSAY W/OPTIC: CPT

## 2023-05-02 RX ORDER — ALBUTEROL SULFATE 90 UG/1
2 AEROSOL, METERED RESPIRATORY (INHALATION) EVERY 4 HOURS PRN
Qty: 18 G | Refills: 0 | Status: SHIPPED | OUTPATIENT
Start: 2023-05-02

## 2023-05-02 RX ORDER — BENZONATATE 100 MG/1
100 CAPSULE ORAL 3 TIMES DAILY PRN
Qty: 15 CAPSULE | Refills: 0 | Status: SHIPPED | OUTPATIENT
Start: 2023-05-02 | End: 2023-05-02 | Stop reason: CLARIF

## 2023-05-02 RX ORDER — CODEINE PHOSPHATE AND GUAIFENESIN 10; 100 MG/5ML; MG/5ML
5 SOLUTION ORAL EVERY 6 HOURS PRN
Qty: 100 ML | Refills: 0 | Status: SHIPPED | OUTPATIENT
Start: 2023-05-02

## 2023-06-12 DIAGNOSIS — I10 ESSENTIAL HYPERTENSION: ICD-10-CM

## 2023-06-12 DIAGNOSIS — E78.00 HYPERCHOLESTEREMIA: ICD-10-CM

## 2023-06-12 RX ORDER — ATORVASTATIN CALCIUM 20 MG/1
TABLET, FILM COATED ORAL
Qty: 90 TABLET | Refills: 0 | OUTPATIENT
Start: 2023-06-12

## 2023-06-12 RX ORDER — LISINOPRIL 20 MG/1
20 TABLET ORAL DAILY
Qty: 30 TABLET | Refills: 0 | Status: SHIPPED | OUTPATIENT
Start: 2023-06-12

## 2023-06-13 NOTE — TELEPHONE ENCOUNTER
Last visit with DR Javed Weiner 12/8/2021. No future appointments. [employer] message sent. CSS=assist please. lease review; protocol failed/no protocol. Requested Prescriptions   Pending Prescriptions Disp Refills    LISINOPRIL 20 MG Oral Tab [Pharmacy Med Name: Lisinopril 20 MG Oral Tablet] 90 tablet 0     Sig: Take 1 tablet by mouth once daily       Hypertensive Medications Protocol Failed - 6/12/2023  5:30 AM        Failed - CMP or BMP in past 6 months     No results found for this or any previous visit (from the past 4392 hour(s)).               Failed - EGFRCR or GFRNAA > 50     GFR Evaluation              Passed - In person appointment in the past 12 or next 3 months     Recent Outpatient Visits              1 month ago Suspected COVID-19 virus infection    [facility], [address] Pop, APRADRIANA    Office Visit    1 year ago Essential hypertension    [address], [address] [facility]livia rosen,     Office Visit    1 year ago     [facility] Hematology Oncology    Nurse Only    1 year ago     [facility] Hematology Oncology    Nurse Only    1 year ago Well adult exam    [address], [address] Qu[id number], MD    Office Visit                      Passed - Last BP reading less than 140/90     BP Readings from Last 1 Encounters:  05/02/23 : 138/72                Passed - In person appointment or virtual visit in the past 6 months     Recent Outpatient Visits              1 month ago Suspected COVID-19 virus infection    [facility], [address] Hew[address] Pop, APRN    Office Visit    1 year ago Essential hypertension    [address], [address] [facility], [address] [facility], Jess We[address], DO    Office Visit    1 year ago     [facility] Hematology Oncology    Nurse Only    1 year ago     [facility] Hematology Oncology    Nurse Only    1 year ago Well adult exam    Ashley Barrow MD    Office Visit                        ATORVASTATIN 20 MG Oral Tab [place] Med Name: Atorvastatin Calcium 20 MG Oral Tablet] 90 tablet 0     Sig: Take 1 tablet by mouth nightly       Cholesterol Medication Protocol Failed - 6/12/2023  5:30 AM        Failed - ALT in past 12 months        Failed - LDL in past 12 months        Failed - Last ALT < 80     Lab Results   Component Value Date    ALT 33 12/21/2021             Failed - Last LDL < 130     Lab Results   Component Value Date    LDL 93 12/21/2021               Passed - In person appointment or virtual visit in the past 12 mos or appointment in next 3 mos     Recent Outpatient Visits              1 month ago Suspected COVID-19 virus infection    [facility], [address] [place], [place] [address] Julie Cea, APRN    Office Visit    1 year ago Essential hypertension    [address], [address] [facility], [address], DO    Office Visit    1 year ago     [facility] Hematology Oncology    Nurse Only    1 year ago     [facility] Hematology Oncology    Nurse Only    1 year ago Well adult exam    Ashley Barrow MD    Office Visit                            Recent Outpatient Visits              1 month ago Suspected COVID-19 virus infection    [facility], [address] [facility] Reynolds Essex, APRN    Office Visit    1 year ago Essential hypertension    [address], [address] [facility] Valentine Severino, DO    Office Visit    1 year ago     [facility] Hematology Oncology    Nurse Only    1 year ago     [facility] Hematology Oncology    Nurse Only    1 year ago Well adult exam    Ashley Barrow MD    Office Visit

## 2023-06-13 NOTE — TELEPHONE ENCOUNTER
1st attempt/my chart message was sent to the patient to schedule an appointment per the below request.

## 2023-06-15 NOTE — TELEPHONE ENCOUNTER
We have sent several [employer] messages. The patient does not read her [employer] messages. I sent a no response letter in the mail.

## 2023-07-03 ENCOUNTER — OFFICE VISIT (OUTPATIENT)
Dept: FAMILY MEDICINE CLINIC | Facility: CLINIC | Age: 58
End: 2023-07-03

## 2023-07-03 VITALS
HEIGHT: 61 IN | BODY MASS INDEX: 29.83 KG/M2 | DIASTOLIC BLOOD PRESSURE: 74 MMHG | SYSTOLIC BLOOD PRESSURE: 133 MMHG | HEART RATE: 67 BPM | WEIGHT: 158 LBS

## 2023-07-03 DIAGNOSIS — Z23 NEED FOR SHINGLES VACCINE: ICD-10-CM

## 2023-07-03 DIAGNOSIS — L91.8 CUTANEOUS SKIN TAGS: ICD-10-CM

## 2023-07-03 DIAGNOSIS — F32.A ANXIETY AND DEPRESSION: ICD-10-CM

## 2023-07-03 DIAGNOSIS — E66.3 OVERWEIGHT (BMI 25.0-29.9): ICD-10-CM

## 2023-07-03 DIAGNOSIS — Z13.1 SCREENING FOR DIABETES MELLITUS: ICD-10-CM

## 2023-07-03 DIAGNOSIS — Z12.31 ENCOUNTER FOR SCREENING MAMMOGRAM FOR BREAST CANCER: ICD-10-CM

## 2023-07-03 DIAGNOSIS — Z01.419 ENCOUNTER FOR GYNECOLOGICAL EXAMINATION: ICD-10-CM

## 2023-07-03 DIAGNOSIS — Z00.00 WELL ADULT EXAM: Primary | ICD-10-CM

## 2023-07-03 DIAGNOSIS — E78.00 HYPERCHOLESTEREMIA: ICD-10-CM

## 2023-07-03 DIAGNOSIS — F41.9 ANXIETY AND DEPRESSION: ICD-10-CM

## 2023-07-03 DIAGNOSIS — I10 ESSENTIAL HYPERTENSION: ICD-10-CM

## 2023-07-03 PROCEDURE — 3008F BODY MASS INDEX DOCD: CPT | Performed by: FAMILY MEDICINE

## 2023-07-03 PROCEDURE — 3078F DIAST BP <80 MM HG: CPT | Performed by: FAMILY MEDICINE

## 2023-07-03 PROCEDURE — 99396 PREV VISIT EST AGE 40-64: CPT | Performed by: FAMILY MEDICINE

## 2023-07-03 PROCEDURE — 3075F SYST BP GE 130 - 139MM HG: CPT | Performed by: FAMILY MEDICINE

## 2023-07-03 RX ORDER — LISINOPRIL 20 MG/1
20 TABLET ORAL DAILY
Qty: 90 TABLET | Refills: 3 | Status: SHIPPED | OUTPATIENT
Start: 2023-07-03 | End: 2024-06-27

## 2023-07-03 RX ORDER — ATORVASTATIN CALCIUM 20 MG/1
20 TABLET, FILM COATED ORAL NIGHTLY
Qty: 90 TABLET | Refills: 3 | Status: SHIPPED | OUTPATIENT
Start: 2023-07-03

## 2023-07-10 ENCOUNTER — LAB ENCOUNTER (OUTPATIENT)
Dept: LAB | Age: 58
End: 2023-07-10
Attending: FAMILY MEDICINE
Payer: COMMERCIAL

## 2023-07-10 DIAGNOSIS — Z13.1 SCREENING FOR DIABETES MELLITUS: ICD-10-CM

## 2023-07-10 DIAGNOSIS — Z00.00 WELL ADULT EXAM: ICD-10-CM

## 2023-07-10 LAB
ALBUMIN SERPL-MCNC: 3.7 G/DL (ref 3.4–5)
ALBUMIN/GLOB SERPL: 1 {RATIO} (ref 1–2)
ALP LIVER SERPL-CCNC: 80 U/L
ALT SERPL-CCNC: 37 U/L
ANION GAP SERPL CALC-SCNC: 12 MMOL/L (ref 0–18)
AST SERPL-CCNC: 23 U/L (ref 15–37)
BASOPHILS # BLD AUTO: 0.08 X10(3) UL (ref 0–0.2)
BASOPHILS NFR BLD AUTO: 1.1 %
BILIRUB SERPL-MCNC: 0.4 MG/DL (ref 0.1–2)
BUN BLD-MCNC: 14 MG/DL (ref 7–18)
BUN/CREAT SERPL: 14.6 (ref 10–20)
CALCIUM BLD-MCNC: 9.2 MG/DL (ref 8.5–10.1)
CHLORIDE SERPL-SCNC: 112 MMOL/L (ref 98–112)
CHOLEST SERPL-MCNC: 169 MG/DL (ref ?–200)
CO2 SERPL-SCNC: 22 MMOL/L (ref 21–32)
CREAT BLD-MCNC: 0.96 MG/DL
DEPRECATED RDW RBC AUTO: 43.8 FL (ref 35.1–46.3)
EOSINOPHIL # BLD AUTO: 0.23 X10(3) UL (ref 0–0.7)
EOSINOPHIL NFR BLD AUTO: 3.2 %
ERYTHROCYTE [DISTWIDTH] IN BLOOD BY AUTOMATED COUNT: 12.8 % (ref 11–15)
EST. AVERAGE GLUCOSE BLD GHB EST-MCNC: 120 MG/DL (ref 68–126)
FASTING PATIENT LIPID ANSWER: YES
FASTING STATUS PATIENT QL REPORTED: YES
GFR SERPLBLD BASED ON 1.73 SQ M-ARVRAT: 69 ML/MIN/1.73M2 (ref 60–?)
GLOBULIN PLAS-MCNC: 3.7 G/DL (ref 2.8–4.4)
GLUCOSE BLD-MCNC: 98 MG/DL (ref 70–99)
HBA1C MFR BLD: 5.8 % (ref ?–5.7)
HCT VFR BLD AUTO: 40.5 %
HDLC SERPL-MCNC: 53 MG/DL (ref 40–59)
HGB BLD-MCNC: 13.6 G/DL
IMM GRANULOCYTES # BLD AUTO: 0.02 X10(3) UL (ref 0–1)
IMM GRANULOCYTES NFR BLD: 0.3 %
LDLC SERPL CALC-MCNC: 90 MG/DL (ref ?–100)
LYMPHOCYTES # BLD AUTO: 2.84 X10(3) UL (ref 1–4)
LYMPHOCYTES NFR BLD AUTO: 39.3 %
MCH RBC QN AUTO: 31.3 PG (ref 26–34)
MCHC RBC AUTO-ENTMCNC: 33.6 G/DL (ref 31–37)
MCV RBC AUTO: 93.3 FL
MONOCYTES # BLD AUTO: 0.42 X10(3) UL (ref 0.1–1)
MONOCYTES NFR BLD AUTO: 5.8 %
NEUTROPHILS # BLD AUTO: 3.63 X10 (3) UL (ref 1.5–7.7)
NEUTROPHILS # BLD AUTO: 3.63 X10(3) UL (ref 1.5–7.7)
NEUTROPHILS NFR BLD AUTO: 50.3 %
NONHDLC SERPL-MCNC: 116 MG/DL (ref ?–130)
OSMOLALITY SERPL CALC.SUM OF ELEC: 302 MOSM/KG (ref 275–295)
PLATELET # BLD AUTO: 283 10(3)UL (ref 150–450)
POTASSIUM SERPL-SCNC: 4.4 MMOL/L (ref 3.5–5.1)
PROT SERPL-MCNC: 7.4 G/DL (ref 6.4–8.2)
RBC # BLD AUTO: 4.34 X10(6)UL
SODIUM SERPL-SCNC: 146 MMOL/L (ref 136–145)
TRIGL SERPL-MCNC: 151 MG/DL (ref 30–149)
TSI SER-ACNC: 2.49 MIU/ML (ref 0.36–3.74)
VLDLC SERPL CALC-MCNC: 24 MG/DL (ref 0–30)
WBC # BLD AUTO: 7.2 X10(3) UL (ref 4–11)

## 2023-07-10 PROCEDURE — 36415 COLL VENOUS BLD VENIPUNCTURE: CPT

## 2023-07-10 PROCEDURE — 84443 ASSAY THYROID STIM HORMONE: CPT

## 2023-07-10 PROCEDURE — 80053 COMPREHEN METABOLIC PANEL: CPT

## 2023-07-10 PROCEDURE — 83036 HEMOGLOBIN GLYCOSYLATED A1C: CPT

## 2023-07-10 PROCEDURE — 80061 LIPID PANEL: CPT

## 2023-07-10 PROCEDURE — 85025 COMPLETE CBC W/AUTO DIFF WBC: CPT

## 2023-07-14 LAB
HPV I/H RISK 1 DNA SPEC QL NAA+PROBE: NEGATIVE
LAST PAP RESULT: NORMAL
PAP HISTORY (OTHER THAN LAST PAP): NORMAL

## 2023-07-16 PROBLEM — R73.03 PREDIABETES: Status: ACTIVE | Noted: 2023-07-16

## 2023-07-25 ENCOUNTER — TELEPHONE (OUTPATIENT)
Dept: FAMILY MEDICINE CLINIC | Facility: CLINIC | Age: 58
End: 2023-07-25

## 2023-07-25 NOTE — TELEPHONE ENCOUNTER
Hi Pina  You pap is normal, and test for  HPV ( Human papilloma Virus) is negative. Recommend repeating pap in 3 years but you till need an annual physical and pelvic examination. Results released through My Chart. ... Written by Lina Wright MD on 7/16/2023  1:37 PM CDT View Full Comments    Labs overall good, increase water intake to at least 64oz a day. A1c test suggests prediabetes and recommend weight loss and exercise and reducing intake of simple carbohydrates- white bread, pasta, potatoes, pasta. Results released through My Chart. Written by Lina Wright MD on 7/16/2023  1:22 PM CDT     Patient does not have access to [employer] right now. Patient notified of above results, verbalized understanding. Assisted patient with resetting password.

## 2023-10-16 ENCOUNTER — HOSPITAL ENCOUNTER (OUTPATIENT)
Dept: MAMMOGRAPHY | Facility: HOSPITAL | Age: 58
Discharge: HOME | End: 2023-10-16
Attending: FAMILY MEDICINE
Payer: COMMERCIAL

## 2023-10-16 DIAGNOSIS — Z12.31 ENCOUNTER FOR SCREENING MAMMOGRAM FOR BREAST CANCER: ICD-10-CM

## 2023-10-16 PROCEDURE — 77067 SCR MAMMO BI INCL CAD: CPT | Performed by: FAMILY MEDICINE

## 2023-10-16 PROCEDURE — 77063 BREAST TOMOSYNTHESIS BI: CPT | Performed by: FAMILY MEDICINE

## 2023-10-23 ENCOUNTER — OFFICE VISIT (OUTPATIENT)
Dept: DERMATOLOGY CLINIC | Facility: CLINIC | Age: 58
End: 2023-10-23

## 2023-10-23 DIAGNOSIS — Z12.83 SCREENING EXAM FOR SKIN CANCER: Primary | ICD-10-CM

## 2023-10-23 DIAGNOSIS — L82.1 SEBORRHEIC KERATOSIS: ICD-10-CM

## 2023-10-23 DIAGNOSIS — D18.01 HEMANGIOMA OF SKIN: ICD-10-CM

## 2023-10-23 DIAGNOSIS — L91.8 SKIN TAG: ICD-10-CM

## 2023-10-23 PROCEDURE — 99203 OFFICE O/P NEW LOW 30 MIN: CPT | Performed by: PHYSICIAN ASSISTANT

## 2023-10-23 NOTE — PROGRESS NOTES
HPI:    Patient ID: Cece Abraham is a 62year old female. Patient presents for full body skin exam. Has skin tags all over her body. Denies itching. Denies tenderness. Patient denies personal or family hx of skin cancer. She does not wear sun screen. no draining or tnederness noted. No allergies to medications noted. Review of Systems   Constitutional:  Negative for chills and fever. Musculoskeletal:  Negative for arthralgias and myalgias. Skin:  Positive for rash. Negative for color change and wound. Current Outpatient Medications   Medication Sig Dispense Refill    atorvastatin 20 MG Oral Tab Take 1 tablet (20 mg total) by mouth nightly. Physical overdue 90 tablet 3    lisinopril 20 MG Oral Tab Take 1 tablet (20 mg total) by mouth daily. 90 tablet 3     Allergies:No Known Allergies   There were no vitals taken for this visit. There is no height or weight on file to calculate BMI. PHYSICAL EXAM:   Physical Exam  Constitutional:       General: She is not in acute distress. Appearance: Normal appearance. Skin:     General: Skin is warm and dry. Findings: Rash present. Comments: Sks noted throughout the body scattered. No draining or tenderness noted. Stuck on appearance noted. Cherry angiomas noted scattered on the torso. No draining or tenderness notd. Skin tags noted around the neck. Pedunculated lesions about 2-3 mm in size. Neurological:      Mental Status: She is alert and oriented to person, place, and time. ASSESSMENT/PLAN:   1. Screening exam for skin cancer  -After discussion with patient, advised the following:  Reassurance regarding other benign skin lesions. Signs and symptoms of skin cancer, ABCDE's of melanoma discussed with patient. Sunscreen use, sun protection, self exams reviewed. Followup as noted RTC ---routine checkup 6 mos -one year or p.r.n.   -Pt was agreeable to plan and will comply with discussion above.      2. Hemangioma of skin  3. Skin tag  -She will consider having these removed at a later date. 4. Seborrheic keratosis  -After discussion with patient, advised the following:  -Benign nature of lesions discussd  _Return in 1 year for skin check   -Let us know if lesions have changed and return sooner  -To call or follow-up with worsening symptoms or concerns.   -Pt was agreeable to plan and will comply with discussion above. No orders of the defined types were placed in this encounter.       Meds This Visit:  Requested Prescriptions      No prescriptions requested or ordered in this encounter       Imaging & Referrals:  None         #[id number]

## 2024-07-01 DIAGNOSIS — I10 ESSENTIAL HYPERTENSION: ICD-10-CM

## 2024-07-04 NOTE — TELEPHONE ENCOUNTER
Please review; protocol failed/No Protocol    Last Office Visit: 07/03/2023  Will route to Call Center to call to make an appointment.     Requested Prescriptions   Pending Prescriptions Disp Refills    LISINOPRIL 20 MG Oral Tab [Pharmacy Med Name: Lisinopril 20 MG Oral Tablet] 90 tablet 0     Sig: Take 1 tablet by mouth once daily       Hypertension Medications Protocol Failed - 7/4/2024  2:05 PM        Failed - In person appointment or virtual visit in the past 12 mos or appointment in next 3 mos     Recent Outpatient Visits              8 months ago Screening exam for skin cancer    [facility], [facility], Carlos Sutton PA-C    Office Visit    1 year ago [facility]    Office Visit    1 year ago Suspected COVID-19 virus infection    [facility], Zohreh Skinner APRN    Office Visit    2 years ago Essential hypertension    [facility], [place]    Office Visit    2 years ago     [facility] Hematology Oncology    Nurse Only                      Passed - CMP or BMP in past 12 months        Passed - Last BP reading less than 140/90     BP Readings from Last 1 Encounters:   07/03/23 133/74               Passed - EGFRCR or GFRNAA > 50     GFR Evaluation  EGFRCR: 69 , resulted on 7/10/2023               Recent Outpatient Visits              8 months ago [facility] skin cancer    [facility]    Office Visit    1 year ago Well adult exam    [facility]    Office Visit    1 year ago Suspected COVID-19 virus infection    [facility]    Office Visit    2 years ago Essential hypertension    [facility] Ronak Blancas,     Office Visit    2 years ago     [facility] Hematology Oncology    Nurse Only

## 2024-07-05 RX ORDER — LISINOPRIL 20 MG/1
20 TABLET ORAL DAILY
Qty: 90 TABLET | Refills: 0 | Status: SHIPPED | OUTPATIENT
Start: 2024-07-05

## 2024-08-07 ENCOUNTER — OFFICE VISIT (OUTPATIENT)
Dept: FAMILY MEDICINE CLINIC | Facility: CLINIC | Age: 59
End: 2024-08-07

## 2024-08-07 ENCOUNTER — LAB ENCOUNTER (OUTPATIENT)
Dept: LAB | Age: 59
End: 2024-08-07
Attending: FAMILY MEDICINE
Payer: COMMERCIAL

## 2024-08-07 VITALS
HEART RATE: 65 BPM | DIASTOLIC BLOOD PRESSURE: 78 MMHG | OXYGEN SATURATION: 98 % | WEIGHT: 161 LBS | SYSTOLIC BLOOD PRESSURE: 167 MMHG | HEIGHT: 61 IN | BODY MASS INDEX: 30.4 KG/M2 | RESPIRATION RATE: 16 BRPM

## 2024-08-07 DIAGNOSIS — Z12.31 ENCOUNTER FOR SCREENING MAMMOGRAM FOR BREAST CANCER: ICD-10-CM

## 2024-08-07 DIAGNOSIS — R73.03 PREDIABETES: ICD-10-CM

## 2024-08-07 DIAGNOSIS — Z01.419 ENCOUNTER FOR GYNECOLOGICAL EXAMINATION: ICD-10-CM

## 2024-08-07 DIAGNOSIS — E78.00 HYPERCHOLESTEREMIA: ICD-10-CM

## 2024-08-07 DIAGNOSIS — I34.0 MITRAL VALVE INSUFFICIENCY, UNSPECIFIED ETIOLOGY: ICD-10-CM

## 2024-08-07 DIAGNOSIS — I07.1 TRICUSPID VALVE INSUFFICIENCY, UNSPECIFIED ETIOLOGY: ICD-10-CM

## 2024-08-07 DIAGNOSIS — I73.00 RAYNAUD'S DISEASE WITHOUT GANGRENE: ICD-10-CM

## 2024-08-07 DIAGNOSIS — F41.9 ANXIETY AND DEPRESSION: ICD-10-CM

## 2024-08-07 DIAGNOSIS — Z00.00 WELL ADULT EXAM: Primary | ICD-10-CM

## 2024-08-07 DIAGNOSIS — Z00.00 WELL ADULT EXAM: ICD-10-CM

## 2024-08-07 DIAGNOSIS — I10 ESSENTIAL HYPERTENSION: ICD-10-CM

## 2024-08-07 DIAGNOSIS — F32.A ANXIETY AND DEPRESSION: ICD-10-CM

## 2024-08-07 DIAGNOSIS — E66.3 OVERWEIGHT (BMI 25.0-29.9): ICD-10-CM

## 2024-08-07 DIAGNOSIS — Z23 NEED FOR SHINGLES VACCINE: ICD-10-CM

## 2024-08-07 PROBLEM — Z86.73 HISTORY OF CVA (CEREBROVASCULAR ACCIDENT): Status: ACTIVE | Noted: 2024-08-07

## 2024-08-07 LAB
ALBUMIN SERPL-MCNC: 4.4 G/DL (ref 3.2–4.8)
ALBUMIN/GLOB SERPL: 1.5 {RATIO} (ref 1–2)
ALP LIVER SERPL-CCNC: 89 U/L
ALT SERPL-CCNC: 34 U/L
ANION GAP SERPL CALC-SCNC: 5 MMOL/L (ref 0–18)
AST SERPL-CCNC: 28 U/L (ref ?–34)
BASOPHILS # BLD AUTO: 0.09 X10(3) UL (ref 0–0.2)
BASOPHILS NFR BLD AUTO: 1 %
BILIRUB SERPL-MCNC: 0.4 MG/DL (ref 0.3–1.2)
BUN BLD-MCNC: 16 MG/DL (ref 9–23)
BUN/CREAT SERPL: 18 (ref 10–20)
CALCIUM BLD-MCNC: 9.5 MG/DL (ref 8.7–10.4)
CHLORIDE SERPL-SCNC: 108 MMOL/L (ref 98–112)
CHOLEST SERPL-MCNC: 176 MG/DL (ref ?–200)
CO2 SERPL-SCNC: 31 MMOL/L (ref 21–32)
CREAT BLD-MCNC: 0.89 MG/DL
DEPRECATED RDW RBC AUTO: 43.4 FL (ref 35.1–46.3)
EGFRCR SERPLBLD CKD-EPI 2021: 75 ML/MIN/1.73M2 (ref 60–?)
EOSINOPHIL # BLD AUTO: 0.3 X10(3) UL (ref 0–0.7)
EOSINOPHIL NFR BLD AUTO: 3.3 %
ERYTHROCYTE [DISTWIDTH] IN BLOOD BY AUTOMATED COUNT: 12.9 % (ref 11–15)
EST. AVERAGE GLUCOSE BLD GHB EST-MCNC: 126 MG/DL (ref 68–126)
FASTING PATIENT LIPID ANSWER: NO
FASTING STATUS PATIENT QL REPORTED: NO
GLOBULIN PLAS-MCNC: 3 G/DL (ref 2–3.5)
GLUCOSE BLD-MCNC: 97 MG/DL (ref 70–99)
HBA1C MFR BLD: 6 % (ref ?–5.7)
HCT VFR BLD AUTO: 38.8 %
HDLC SERPL-MCNC: 52 MG/DL (ref 40–59)
HGB BLD-MCNC: 13.1 G/DL
IMM GRANULOCYTES # BLD AUTO: 0.02 X10(3) UL (ref 0–1)
IMM GRANULOCYTES NFR BLD: 0.2 %
LDLC SERPL CALC-MCNC: 88 MG/DL (ref ?–100)
LYMPHOCYTES # BLD AUTO: 3.84 X10(3) UL (ref 1–4)
LYMPHOCYTES NFR BLD AUTO: 41.8 %
MCH RBC QN AUTO: 31.1 PG (ref 26–34)
MCHC RBC AUTO-ENTMCNC: 33.8 G/DL (ref 31–37)
MCV RBC AUTO: 92.2 FL
MONOCYTES # BLD AUTO: 0.69 X10(3) UL (ref 0.1–1)
MONOCYTES NFR BLD AUTO: 7.5 %
NEUTROPHILS # BLD AUTO: 4.24 X10 (3) UL (ref 1.5–7.7)
NEUTROPHILS # BLD AUTO: 4.24 X10(3) UL (ref 1.5–7.7)
NEUTROPHILS NFR BLD AUTO: 46.2 %
NONHDLC SERPL-MCNC: 124 MG/DL (ref ?–130)
OSMOLALITY SERPL CALC.SUM OF ELEC: 299 MOSM/KG (ref 275–295)
PLATELET # BLD AUTO: 307 10(3)UL (ref 150–450)
POTASSIUM SERPL-SCNC: 4 MMOL/L (ref 3.5–5.1)
PROT SERPL-MCNC: 7.4 G/DL (ref 5.7–8.2)
RBC # BLD AUTO: 4.21 X10(6)UL
SODIUM SERPL-SCNC: 144 MMOL/L (ref 136–145)
TRIGL SERPL-MCNC: 217 MG/DL (ref 30–149)
TSI SER-ACNC: 2.65 MIU/ML (ref 0.55–4.78)
VLDLC SERPL CALC-MCNC: 35 MG/DL (ref 0–30)
WBC # BLD AUTO: 9.2 X10(3) UL (ref 4–11)

## 2024-08-07 PROCEDURE — 83036 HEMOGLOBIN GLYCOSYLATED A1C: CPT

## 2024-08-07 PROCEDURE — 99213 OFFICE O/P EST LOW 20 MIN: CPT | Performed by: FAMILY MEDICINE

## 2024-08-07 PROCEDURE — 80061 LIPID PANEL: CPT

## 2024-08-07 PROCEDURE — 80053 COMPREHEN METABOLIC PANEL: CPT

## 2024-08-07 PROCEDURE — 99396 PREV VISIT EST AGE 40-64: CPT | Performed by: FAMILY MEDICINE

## 2024-08-07 PROCEDURE — 85025 COMPLETE CBC W/AUTO DIFF WBC: CPT

## 2024-08-07 PROCEDURE — 36415 COLL VENOUS BLD VENIPUNCTURE: CPT

## 2024-08-07 PROCEDURE — 84443 ASSAY THYROID STIM HORMONE: CPT

## 2024-08-07 RX ORDER — LISINOPRIL 20 MG/1
30 TABLET ORAL DAILY
Qty: 135 TABLET | Refills: 0 | Status: SHIPPED | OUTPATIENT
Start: 2024-08-07 | End: 2024-11-05

## 2024-08-07 NOTE — PROGRESS NOTES
HPI:    Patient ID: Pina Couch is a 59 year old female.    HPI  Chief Complaint   Patient presents with    Physical     Annual px       Wt Readings from Last 6 Encounters:   08/07/24 161 lb (73 kg)   07/03/23 158 lb (71.7 kg)   05/02/23 156 lb 12.8 oz (71.1 kg)   04/18/22 160 lb (72.6 kg)   04/01/22 155 lb (70.3 kg)   12/08/21 156 lb (70.8 kg)     BP Readings from Last 3 Encounters:   08/07/24 (!) 167/78   07/03/23 133/74   05/02/23 138/72     ? Tia  5yrs ago in [place]  did see cardiology and neuro and nothing was found   Taking her blood pressure meds.    Sometimes feels imbalance when she walks  No chest pain , shortness of breath , nausea, vomting  Symptoms  resolved       Sexually active  Partner since 8 yrs    Has 3 boys- doing well.  Ex  has brain cancer  Review of Systems   Constitutional:  Negative for activity change, appetite change, chills, fatigue, fever and unexpected weight change.   HENT:  Negative for congestion, dental problem, drooling, ear discharge, ear pain, facial swelling, hearing loss, mouth sores, nosebleeds, postnasal drip, rhinorrhea, sinus pressure, sinus pain, sneezing, sore throat, tinnitus, trouble swallowing and voice change.    Eyes:  Negative for pain, discharge, redness and visual disturbance.   Respiratory:  Negative for cough, shortness of breath and wheezing.    Cardiovascular:  Negative for chest pain, palpitations and leg swelling.   Gastrointestinal:  Negative for abdominal pain, anal bleeding, blood in stool, constipation, diarrhea, nausea, rectal pain and vomiting.   Endocrine: Negative for cold intolerance, heat intolerance, polydipsia, polyphagia and polyuria.   Genitourinary:  Negative for decreased urine volume, difficulty urinating, dysuria, flank pain, frequency, menstrual problem, pelvic pain, urgency, vaginal bleeding, vaginal discharge and vaginal pain.        Is menopausal     Musculoskeletal:  Negative for arthralgias, back pain and myalgias.   Skin:   Negative for rash.   Neurological:  Negative for dizziness, seizures, syncope, weakness, numbness and headaches.   Hematological:  Does not bruise/bleed easily.   Psychiatric/Behavioral:  Negative for behavioral problems, decreased concentration, self-injury, sleep disturbance and suicidal ideas. The patient is not nervous/anxious.        BP (!) 167/78   Pulse 65   Resp 16   Ht 5' 1\" (1.549 m)   Wt 161 lb (73 kg)   SpO2 98%   BMI 30.42 kg/m²     Past Medical History:    Anxiety state, unspecified    Bradycardia    Hyperlipidemia    Screening for lipoid disorders    lipid panel    Unspecified essential hypertension     Past Surgical History:   Procedure Laterality Date          1    Cholecystectomy      Colonoscopy N/A 6/15/2018    Procedure: COLONOSCOPY;  Surgeon: Alli Hoang MD;  [facility] ENDOSCOPY    [facility]      3    Other surgical history  2018    gall bladder     Social History     Socioeconomic History    Marital status:      Spouse name: Not on file    Number of children: Not on file    Years of education: Not on file    Highest education level: Not on file   Occupational History    Not on file   Tobacco Use    Smoking status: Never    Smokeless tobacco: Never   Vaping Use    Vaping status: Never Used   Substance and Sexual Activity    Alcohol use: No    Drug use: No    Sexual activity: Not on file   Other Topics Concern     Service Not Asked    Blood Transfusions Not Asked    Caffeine Concern Yes     Comment: Coffee, 1 cup daily    Occupational Exposure Not Asked    Hobby Hazards Not Asked    Sleep Concern Not Asked    Stress Concern Not Asked    Weight Concern Not Asked    Special Diet Not Asked    Back Care Not Asked    Exercise No    Bike Helmet Not Asked    Seat Belt Not Asked    Self-Exams Not Asked    Grew up on a farm Not Asked    History of tanning No    Outdoor occupation Not Asked    Breast feeding Not Asked    Reaction to local anesthetic No    Pt has  a pacemaker No    Pt has a defibrillator No   Social History Narrative    The patient does not use an assistive device..      The patient does live in a home with stairs.     Social Determinants of Health     Financial Resource Strain: Not on file   Food Insecurity: Not on file   Transportation Needs: Not on file   Physical Activity: Not on file   Stress: Not on file   Social Connections: Not on file   Housing Stability: Not on file     Family History   Problem Relation Age of Onset    Hypertension Mother     Lipids Mother     Colon Cancer Father 80    Colon Cancer Brother 45    Other (testicular cancer) Brother 44    Stroke Maternal Grandfather     Diabetes Neg     Heart Disorder Neg        Immunization History   Administered Date(s) Administered    Covid-19 Vaccine Moderna 100 mcg/0.5 ml 04/26/2021, 05/24/2021    Covid-19 Vaccine Moderna 50 mcg/0.5 mL 07/12/2022    FLULAVAL 6 months & older 0.5 ml Prefilled syringe (22371) 10/04/2017, 12/12/2018, 12/08/2021    FLUZONE 6 months and older PFS 0.5 ml (77187) 10/08/2015, 10/04/2017, 12/12/2018    Flucelvax 0.5 Ml Quad PFS Single Dose 10/05/2020    Influenza 10/05/2020    TDAP 02/23/2015       Health Maintenance   Topic Date Due    Zoster Vaccines (1 of 2) Never done    COVID-19 Vaccine (4 - 2023-24 season) 09/01/2023    Annual Physical  07/03/2024    Mammogram  10/16/2024    Pneumococcal Vaccine: Birth to 64yrs (1 of 2 - PCV) 07/12/2030 (Originally 7/12/1971)    Influenza Vaccine (1) 10/01/2024    DTaP,Tdap,and Td Vaccines (2 - Td or Tdap) 02/23/2025    Pap Smear  07/03/2026    Colorectal Cancer Screening  06/15/2028    Annual Depression Screening  Completed          Current Outpatient Medications   Medication Sig Dispense Refill    lisinopril 20 MG Oral Tab Take 1 tablet (20 mg total) by mouth daily. 90 tablet 0    atorvastatin 20 MG Oral Tab Take 1 tablet (20 mg total) by mouth nightly. Physical overdue 90 tablet 3     Allergies:No Known Allergies   PHYSICAL EXAM:      Chief Complaint   Patient presents with    Physical     Annual px      Physical Exam  Vitals and nursing note reviewed.   Constitutional:       Appearance: She is well-developed.   HENT:      Head: Normocephalic and atraumatic.      Right Ear: External ear normal.      Left Ear: External ear normal.      Nose: Nose normal.      Mouth/Throat:      Pharynx: No oropharyngeal exudate.   Eyes:      General:         Right eye: No discharge.         Left eye: No discharge.      Conjunctiva/sclera: Conjunctivae normal.      Pupils: Pupils are equal, round, and reactive to light.   Neck:      Thyroid: No thyromegaly.   Cardiovascular:      Rate and Rhythm: Normal rate and regular rhythm.      Heart sounds: Normal heart sounds. No murmur heard.  Pulmonary:      Effort: Pulmonary effort is normal.      Breath sounds: Normal breath sounds. No wheezing.   Chest:   Breasts:     Breasts are symmetrical.      Right: Normal.      Left: Normal.   Abdominal:      General: Bowel sounds are normal.      Palpations: Abdomen is soft. There is no mass.      Tenderness: There is no abdominal tenderness.   Genitourinary:     Labia:         Right: No rash, tenderness, lesion or injury.         Left: No rash, tenderness, lesion or injury.       Vagina: Normal. No vaginal discharge.      Cervix: No cervical motion tenderness, discharge or friability.      Adnexa:         Right: No mass, tenderness or fullness.          Left: No mass, tenderness or fullness.     Musculoskeletal:         General: No tenderness.      Cervical back: Normal range of motion and neck supple.   Lymphadenopathy:      Cervical: No cervical adenopathy.      Upper Body:      Right upper body: No supraclavicular or axillary adenopathy.      Left upper body: No supraclavicular or axillary adenopathy.   Skin:     General: Skin is dry.      Findings: No rash.   Neurological:      Mental Status: She is alert and oriented to person, place, and time.      Cranial Nerves: No  cranial nerve deficit.      Motor: No abnormal muscle tone.      Coordination: Coordination normal.      Deep Tendon Reflexes: Reflexes are normal and symmetric. Reflexes normal.   Psychiatric:         Behavior: Behavior normal.         Thought Content: Thought content normal.         Judgment: Judgment normal.                ASSESSMENT/PLAN:     Return yearly for physicals  Follow up with dentist every 6 months  Follow up with eye doctor yearly  Recommend aerobic exercise for at least 30mins 5 days a week  Yearly flu shot  Tetanus booster every 10 years (Tdap/ Td)  Labs ordered/ or reviewed if done prior to appointment     Encounter Diagnoses   Name Primary?    Well adult exam Yes    Anxiety and depression     Hypercholesteremia     Encounter for gynecological examination     Essential hypertension     Overweight (BMI 25.0-29.9)     Need for shingles vaccine     Raynaud's disease without gangrene     Prediabetes     Encounter for screening mammogram for breast cancer     History of CVA (cerebrovascular accident)        1. Well adult exam    - CBC With Differential With Platelet; Future  - Comp Metabolic Panel (14); Future  - Lipid Panel; Future  - TSH W Reflex To Free T4; Future  - Hemoglobin A1C; Future    2. Anxiety and depression  Doing well    3. Hypercholesteremia  Check labs    4. Encounter for gynecological examination  Breast pelvic and breast exam done    5. Essential hypertension  Uncontrolled  Increase lisinopril to 20mg 1 1/2 tabs daily    6. Overweight (BMI 25.0-29.9)  Wt Readings from Last 6 Encounters:   08/07/24 161 lb (73 kg)   07/03/23 158 lb (71.7 kg)   05/02/23 156 lb 12.8 oz (71.1 kg)   04/18/22 160 lb (72.6 kg)   04/01/22 155 lb (70.3 kg)   12/08/21 156 lb (70.8 kg)       Highly recommend to lose weight.  Discussed good dietary and eating habits as well as increasing vegetable and fruit intake.  Recommending avoiding foods high in fat content.  Recommend exercising at least 30-40 minutes 5-6  days a week.  Avoid skipping meals.  Making healthy choices for snacks and also limiting sugary beverages.      7. Need for shingles vaccine  Declined today  - ZOSTER VACC RECOMBINANT IM NJX    8. Raynaud's disease without gangrene  stable    9. Prediabetes  Continue weight loss    10. Encounter for screening mammogram for breast cancer    - [facility] ALEC 2D+3D SCREENING BILAT (CPT=77067/27327); Future      Orders Placed This Encounter   Procedures    CBC With Differential With Platelet    Comp Metabolic Panel (14)    Lipid Panel    TSH W Reflex To Free T4    Hemoglobin A1C    ZOSTER VACC RECOMBINANT IM NJX       The above note was creating using Dragon speech recognition technology. Please excuse any typos    Meds This Visit:  Requested Prescriptions      No prescriptions requested or ordered in this encounter       Imaging & Referrals:  ZOSTER VACC RECOMBINANT IM NJX  [facility] ALEC 2D+3D SCREENING BILAT (CPT=77067/08732)       ID#[id number]

## 2024-08-12 ENCOUNTER — HOSPITAL ENCOUNTER (OUTPATIENT)
Dept: CV DIAGNOSTICS | Facility: HOSPITAL | Age: 59
Discharge: HOME | End: 2024-08-12
Attending: FAMILY MEDICINE
Payer: COMMERCIAL

## 2024-08-12 DIAGNOSIS — I34.0 MITRAL VALVE INSUFFICIENCY, UNSPECIFIED ETIOLOGY: ICD-10-CM

## 2024-08-12 DIAGNOSIS — I07.1 TRICUSPID VALVE INSUFFICIENCY, UNSPECIFIED ETIOLOGY: ICD-10-CM

## 2024-08-12 PROCEDURE — 93306 TTE W/DOPPLER COMPLETE: CPT | Performed by: FAMILY MEDICINE

## 2024-08-16 ENCOUNTER — TELEPHONE (OUTPATIENT)
Dept: FAMILY MEDICINE CLINIC | Facility: CLINIC | Age: 59
End: 2024-08-16

## 2024-08-16 NOTE — TELEPHONE ENCOUNTER
Patient calling for results below.Results/recommendations reviewed and patient verbalizes understanding.  Patient agrees to plan.        Echocardiogram overall normal other than mildly leaky valves which shows mild regurgitation.  Continue to control blood pressures and to follow-up with cardiology  Results released through My Chart.   Written by Anibal De La Cruz MD on 8/12/2024  8:34 PM CDT            Labs overall good other than prediabetes that has worsened.  Please continue to work on reducing intake of simple carbohydrates such as white bread, pasta potatoes white rice sweets and tortillas.  Encourage aerobic exercise at least 30 to 45 minutes- 5 days a week.  Cholesterol levels are good other than triglycerides are elevated.  Please cut back on greasy foods and any processed foods as well  ...   Written by Anibal De La Cruz MD on 8/12/2024 10:11 PM CDT

## 2024-11-02 ENCOUNTER — HOSPITAL ENCOUNTER (OUTPATIENT)
Dept: MAMMOGRAPHY | Facility: HOSPITAL | Age: 59
Discharge: HOME | End: 2024-11-02
Attending: FAMILY MEDICINE
Payer: COMMERCIAL

## 2024-11-02 DIAGNOSIS — Z12.31 ENCOUNTER FOR SCREENING MAMMOGRAM FOR BREAST CANCER: ICD-10-CM

## 2024-11-02 PROCEDURE — 77067 SCR MAMMO BI INCL CAD: CPT | Performed by: FAMILY MEDICINE

## 2024-11-02 PROCEDURE — 77063 BREAST TOMOSYNTHESIS BI: CPT | Performed by: FAMILY MEDICINE

## 2024-11-09 DIAGNOSIS — I10 ESSENTIAL HYPERTENSION: ICD-10-CM

## 2024-11-13 RX ORDER — LISINOPRIL 20 MG/1
30 TABLET ORAL DAILY
Qty: 135 TABLET | Refills: 0 | Status: SHIPPED | OUTPATIENT
Start: 2024-11-13

## 2024-11-13 NOTE — TELEPHONE ENCOUNTER
Verified name and  of patient.    Boyd from [facility] [employer] calling to follow up on refill request- advise that refill request is still in process.

## 2024-11-13 NOTE — TELEPHONE ENCOUNTER
Please review; protocol failed/ has no protocol    Requested Prescriptions   Pending Prescriptions Disp Refills    lisinopril 20 MG Oral Tab [Pharmacy Med Name: Lisinopril 20 MG Oral Tablet] 135 tablet 3     Sig: TAKE 1 & 1/2 (ONE & ONE-HALF) TABLETS BY MOUTH ONCE DAILY       Hypertension Medications Protocol Failed - 11/13/2024  4:14 PM        Failed - Last BP reading less than 140/90     BP Readings from Last 1 Encounters:   08/07/24 (!) 167/78               Passed - CMP or BMP in past 12 months        Passed - In person appointment or virtual visit in the past 12 mos or appointment in next 3 mos     Recent Outpatient Visits              3 months ago Well adult exam    [facility]    Office Visit    1 year ago [facility]    [facility]    1 year [facility]    [facility] Visit    1 year ago Suspected COVID-19 virus infection    [facility] APRN    Office Visit    2 years ago Essential hypertension    [facility], [address], [place]    Office Visit                      Passed - EGFRCR or GFRNAA > 50     GFR Evaluation  EGFRCR: 75 , resulted on 8/7/2024             Recent Outpatient Visits              3 months ago [facility] adult [facility]    [facility] Visit    1 year ago [facility] skin cancer    [facility]    Office Visit    1 year ago Well adult exam    [facility]    Office Visit    1 year ago Suspected COVID-19 virus infection    [facility],  Zhane Maldonado, Zohreh Skinner APRN    Office Visit    2 years ago Essential hypertension    [facility], Ronak Finch DO    Office Visit

## 2024-11-19 DIAGNOSIS — E78.00 HYPERCHOLESTEREMIA: ICD-10-CM

## 2024-11-22 RX ORDER — ATORVASTATIN CALCIUM 20 MG/1
20 TABLET, FILM COATED ORAL NIGHTLY
Qty: 90 TABLET | Refills: 3 | Status: SHIPPED | OUTPATIENT
Start: 2024-11-22

## 2024-11-22 NOTE — TELEPHONE ENCOUNTER
Refill passed per [facility] protocol.  Requested Prescriptions   Pending Prescriptions Disp Refills    ATORVASTATIN 20 MG Oral Tab [Pharmacy Med Name: Atorvastatin Calcium 20 MG Oral Tablet] 90 tablet 0     Sig: TAKE 1 TABLET BY MOUTH NIGHTLY *PHYSICAL  OVERDUE*       Cholesterol Medication Protocol Passed - 11/22/2024 12:28 PM        Passed - ALT < 80     Lab Results   Component Value Date    ALT 34 08/07/2024             Passed - ALT resulted within past year        Passed - Lipid panel within past 12 months     Lab Results   Component Value Date    CHOLEST 176 08/07/2024    TRIG 217 (H) 08/07/2024    HDL 52 08/07/2024    LDL 88 08/07/2024    VLDL 35 (H) 08/07/2024    NONHDLC 124 08/07/2024             Passed - In person appointment or virtual visit in the past 12 mos or appointment in next 3 mos     Recent Outpatient Visits              [facility]    [facility]    1 year ago [facility] exam for skin cancer    [facility]    Office Visit    1 year ago Well adult exam    [facility]    Office Visit    1 year ago Suspected COVID-19 virus infection    [facility] [place]    Office Visit    2 years ago Essential hypertension    [facility] [place]    Office Visit                           Recent Outpatient Visits              3 months ago Well adult exam    [facility]    Office Visit    1 year ago Screening exam for skin cancer    [facility]    Office Visit    1 year ago [facility] exam    [facility]    Office Visit    1 year ago Suspected COVID-19 virus infection    [facility], [address], [place]    Office Visit    2 years ago Essential hypertension    [facility], [address], Ronak Finch DO    Office Visit

## 2025-02-07 DIAGNOSIS — I10 ESSENTIAL HYPERTENSION: ICD-10-CM

## 2025-02-11 RX ORDER — LOSARTAN POTASSIUM 50 MG/1
50 TABLET ORAL 2 TIMES DAILY
Qty: 60 TABLET | Refills: 0 | Status: SHIPPED | OUTPATIENT
Start: 2025-02-11

## 2025-02-11 RX ORDER — LISINOPRIL 20 MG/1
30 TABLET ORAL DAILY
Qty: 135 TABLET | Refills: 3 | OUTPATIENT
Start: 2025-02-11

## 2025-02-11 NOTE — TELEPHONE ENCOUNTER
Please review. Protocol Failed; No Protocol    BP Readings from Last 3 Encounters:   08/07/24 (!) 167/78   07/03/23 133/74   05/02/23 138/72         Requested Prescriptions   Pending Prescriptions Disp Refills    LISINOPRIL 20 MG Oral Tab [Pharmacy Med Name: Lisinopril 20 MG Oral Tablet] 135 tablet 0     Sig: TAKE 1 & 1/2 (ONE & ONE-HALF) TABLETS BY MOUTH ONCE DAILY       Hypertension Medications Protocol Failed - 2/11/2025  4:23 PM        Failed - Last BP reading less than 140/90     BP Readings from Last 1 Encounters:   08/07/24 (!) 167/78               Passed - CMP or BMP in past 12 months        Passed - In person appointment or virtual visit in the past 12 mos or appointment in next 3 mos     Recent Outpatient Visits              6 months ago Well adult exam    [facility]    Office Visit    1 year ago [facility] skin cancer    [facility]    Office Visit    1 year ago Well adult exam    [facility]    Office Visit    1 year ago Suspected COVID-19 virus infection    [facility] [address] Jennifer, APRN    Office Visit    2 years ago Essential hypertension    [facility], [address] Matthew, DO    Office Visit                      Passed - EGFRCR or GFRNAA > 50     GFR Evaluation  EGFRCR: 75 , resulted on 8/7/2024          Passed - Medication is active on med list                 Recent Outpatient Visits              6 months ago Well adult exam    [facility]    Office Visit    1 year ago Screening exam for skin cancer    [facility]    Office Visit    1 year ago Well adult exam    [facility]    Office Visit    1 year ago Suspected COVID-19 virus infection    [facility], [address], [place]    Office Visit    2 years ago Essential hypertension    [facility], [address], Ronak Finch DO    Office Visit

## 2025-02-12 NOTE — TELEPHONE ENCOUNTER
[employer] message sent to patient to contact the office to schedule appointment.

## 2025-02-17 NOTE — TELEPHONE ENCOUNTER
Patient calling back, verified name and date of birth.  Patient reports that she has 5 doses of lisinoptil on hand at home.  She agrees to schedule a follow up appointment as per Dr De La Cruz's 2/11/25 note below:  Future Appointments   Date Time Provider Department Center   2/21/2025  3:10 PM Juanita Henao APRN [facility]

## 2025-02-21 ENCOUNTER — OFFICE VISIT (OUTPATIENT)
Dept: FAMILY MEDICINE CLINIC | Facility: CLINIC | Age: 60
End: 2025-02-21

## 2025-02-21 VITALS
SYSTOLIC BLOOD PRESSURE: 155 MMHG | HEART RATE: 79 BPM | BODY MASS INDEX: 30.96 KG/M2 | HEIGHT: 61 IN | OXYGEN SATURATION: 100 % | DIASTOLIC BLOOD PRESSURE: 72 MMHG | WEIGHT: 164 LBS

## 2025-02-21 DIAGNOSIS — I10 ESSENTIAL HYPERTENSION: Primary | ICD-10-CM

## 2025-02-21 DIAGNOSIS — I07.1 TRICUSPID VALVE INSUFFICIENCY, UNSPECIFIED ETIOLOGY: ICD-10-CM

## 2025-02-21 DIAGNOSIS — I34.0 MITRAL VALVE INSUFFICIENCY, UNSPECIFIED ETIOLOGY: ICD-10-CM

## 2025-02-21 PROCEDURE — 99214 OFFICE O/P EST MOD 30 MIN: CPT

## 2025-02-21 RX ORDER — LISINOPRIL 20 MG/1
20 TABLET ORAL DAILY
COMMUNITY
End: 2025-02-21

## 2025-02-21 RX ORDER — AMLODIPINE BESYLATE 5 MG/1
5 TABLET ORAL DAILY
Qty: 30 TABLET | Refills: 0 | Status: SHIPPED | OUTPATIENT
Start: 2025-02-21 | End: 2025-03-23

## 2025-02-21 RX ORDER — LISINOPRIL 30 MG/1
30 TABLET ORAL DAILY
Qty: 30 TABLET | Refills: 0 | Status: SHIPPED | OUTPATIENT
Start: 2025-02-21 | End: 2025-03-23

## 2025-02-21 NOTE — ASSESSMENT & PLAN NOTE
Orders:    lisinopril 30 MG Oral Tab; Take 1 tablet (30 mg total) by mouth daily.    amLODIPine 5 MG Oral Tab; Take 1 tablet (5 mg total) by mouth daily.    Cardio Referral - Internal  -Discussed with patient about how to monitoring BP at home and continue medication daily.  -Advised low salt diet.  -Eat less of canned , frozen, dried, packaged and fast food.  -Limit caffeine, alcohol. No smoking.  -Exercise regularly, at least 20 minutes 3 times a week.  -Maintain healthy body weight.   -Avoid stress.  -Lifestyle modification has potential for  improving BP control

## 2025-02-21 NOTE — PROGRESS NOTES
Subjective:   Pina Couch is a 59 year old female who presents for Follow - Up (Blood pressure follow up ).     Patient's hypertension has been poor control. She has not been taking her medication regularly. Patient was given losartan, but she stopped taking it after a couple of days due to causing an upset stomach. She complains of no hypertensive related symptoms and denies headache, blurred vision, vision changes, edema, epistaxis, chest pain, chest pressure, chest heaviness, chest tightness , shortness of breath, dyspnea on exertion, palpitations , paroxysmal nocturnal dyspnea, and orthopnea . Patient checks BP at home once in awhile.  Home SBP ranging from 135-155 and DBP ranging from 70-95.  Diet/exercise: Pt reports she is not exercising and is not watching salt in diet.     Patient blood pressure start of visit is 172/79. She states that she went to her ex-husbands  and is stressed out about that. Repeat check was 188/73. Patient blood pressure at end of visit was 155/72.      History/Other:      Chief Complaint Reviewed and Verified  Nursing Notes Reviewed and   Verified  Tobacco Reviewed  Allergies Reviewed  Medications Reviewed    Problem List Reviewed  Medical History Reviewed  Surgical History   Reviewed  OB Status Reviewed  Family History Reviewed           Tobacco:  She has never smoked tobacco.      Current Outpatient Medications   Medication Sig Dispense Refill    lisinopril 30 MG Oral Tab Take 1 tablet (30 mg total) by mouth daily. 30 tablet 0    amLODIPine 5 MG Oral Tab Take 1 tablet (5 mg total) by mouth daily. 30 tablet 0    atorvastatin 20 MG Oral Tab Take 1 tablet (20 mg total) by mouth nightly. 90 tablet 3       Allergies[1]         Review of Systems   Constitutional: Negative.  Negative for activity change and fatigue.   HENT: Negative.  Negative for congestion, ear pain, rhinorrhea and sneezing.    Eyes: Negative.  Negative for redness.   Respiratory: Negative.  Negative  for cough, shortness of breath and wheezing.    Cardiovascular: Negative.  Negative for chest pain.   Gastrointestinal: Negative.  Negative for abdominal pain, constipation, diarrhea, nausea and vomiting.   Endocrine: Negative.    Genitourinary: Negative.  Negative for difficulty urinating and frequency.   Musculoskeletal: Negative.  Negative for back pain, joint swelling and myalgias.   Skin: Negative.  Negative for rash.   Allergic/Immunologic: Negative.    Neurological: Negative.  Negative for dizziness, syncope, light-headedness and headaches.   Hematological: Negative.    Psychiatric/Behavioral: Negative.           Objective:   /72 (BP Location: Left arm, Patient Position: Sitting, Cuff Size: adult)   Pulse 79   Ht 5' 1\" (1.549 m)   Wt 164 lb (74.4 kg)   SpO2 100%   BMI 30.99 kg/m²  Estimated body mass index is 30.99 kg/m² as calculated from the following:    Height as of this encounter: 5' 1\" (1.549 m).    Weight as of this encounter: 164 lb (74.4 kg).      Physical Exam  Vitals and nursing note reviewed.   Constitutional:       Appearance: Normal appearance. She is normal weight.   HENT:      Head: Normocephalic and atraumatic.      Right Ear: Tympanic membrane normal.      Left Ear: Tympanic membrane normal.      Nose: Nose normal.      Mouth/Throat:      Mouth: Mucous membranes are moist.      Pharynx: Oropharynx is clear.   Eyes:      Extraocular Movements: Extraocular movements intact.      Conjunctiva/sclera: Conjunctivae normal.      Pupils: Pupils are equal, round, and reactive to light.   Cardiovascular:      Rate and Rhythm: Normal rate and regular rhythm.      Pulses: Normal pulses.      Heart sounds: Normal heart sounds.   Pulmonary:      Effort: Pulmonary effort is normal.      Breath sounds: Normal breath sounds.   Abdominal:      General: Abdomen is flat. Bowel sounds are normal.      Palpations: Abdomen is soft.   Musculoskeletal:         General: Normal range of motion.       Cervical back: Normal range of motion and neck supple.   Skin:     General: Skin is warm and dry.   Neurological:      General: No focal deficit present.      Mental Status: She is alert and oriented to person, place, and time. Mental status is at baseline.   Psychiatric:         Mood and Affect: Mood normal.         Behavior: Behavior normal.         Thought Content: Thought content normal.         Judgment: Judgment normal.           Assessment & Plan:     Assessment & Plan  Essential hypertension    Orders:    lisinopril 30 MG Oral Tab; Take 1 tablet (30 mg total) by mouth daily.    amLODIPine 5 MG Oral Tab; Take 1 tablet (5 mg total) by mouth daily.    Cardio Referral - Internal  -Discussed with patient about how to monitoring BP at home and continue medication daily.  -Advised low salt diet.  -Eat less of canned , frozen, dried, packaged and fast food.  -Limit caffeine, alcohol. No smoking.  -Exercise regularly, at least 20 minutes 3 times a week.  -Maintain healthy body weight.   -Avoid stress.  -Lifestyle modification has potential for  improving BP control   Mitral valve insufficiency, unspecified etiology    Orders:    Cardio Referral - Internal    Tricuspid valve insufficiency, unspecified etiology    Orders:    Cardio Referral - Internal           Medication use, effects and side effects discussed in detail with patient. The patient indicated understanding of the diagnosis and agreed with the plan of care.    Return in about 2 weeks (around 3/7/2025).    RICKIE Unger       [1] No Known Allergies

## (undated) DIAGNOSIS — Z13.79 GENETIC SCREENING: Primary | ICD-10-CM

## (undated) DIAGNOSIS — E78.00 HYPERCHOLESTEREMIA: ICD-10-CM

## (undated) DEVICE — Device: Brand: DEFENDO AIR/WATER/SUCTION AND BIOPSY VALVE

## (undated) DEVICE — FORCEP RADIAL JAW 4

## (undated) DEVICE — ENDOSCOPY PACK - LOWER: Brand: MEDLINE INDUSTRIES, INC.

## (undated) NOTE — LETTER
7/27/2021          To Whom It May Concern:    Mayela Bhatti is currently under my medical care. Please excuse the patient from work missed as the patient has been ill. May return to work on Monday, August 2nd, 2021.   If you require additional information

## (undated) NOTE — Clinical Note
Patient Name: Snehal Vasquez  : 1965  MRN: [id number]  Patient Address: [place] [id number]      Coronavirus Disease 2019 (COVID-19)     [facility] is committed to the safety and well-being of our patients, member carefully. If your symptoms get worse, call your healthcare provider immediately. 3. Get rest and stay hydrated.    4. If you have a medical appointment, call the healthcare provider ahead of time and tell them that you have or may have COVID-19.  5. For m of fever-reducing medications; and  · Improvement in respiratory symptoms (e.g., cough, shortness of breath); and  · At least 10 days have passed since symptoms first appeared OR if asymptomatic patient or date of symptom onset is unclear then use 10 days donors must:    · Have had a confirmed diagnosis of COVID-19  · Be symptom-free for at least 14 days*    *Some people will be required to have a repeat COVID-19 test in order to be eligible to donate.  If you’re instructed by Akhil that a repeat test is r random. Researchers are trying to identify similarities between people with a Post-COVID condition to better understand if there are risk factors. How do I prevent a Post-COVID condition?   The best way to prevent the long-term symptoms of COVID-19 is

## (undated) NOTE — LETTER
1/18/2021          To Whom It May Concern:    Snehal Vasquez is currently under my medical care and may not return to work at this time. She may return to work on Monday, 1/25/2021. Activity is restricted as follows: none.     If you require additional inf

## (undated) NOTE — ED AVS SNAPSHOT
Mrs. Ronal Romero   MRN: [id number]    Department:  [facility] Emergency Department   Date of Visit:  5/24/2019           Disclosure     Insurance plans vary and the physician(s) referred by the ER may not be covered by your plan.  Please contact CARE PHYSICIAN AT ONCE OR RETURN IMMEDIATELY TO THE EMERGENCY DEPARTMENT. If you have been prescribed any medication(s), please fill your prescription right away and begin taking the medication(s) as directed.   If you believe that any of the medications

## (undated) NOTE — LETTER
6/15/2023              [address] [facility] [address]         Dear Jj Young,    This letter is to inform you that our office has made several attempts to reach you by phone without success. We were attempting to contact you by phone regarding prescription request    A Physical appointment is needed for any future medication requests. Please contact our office at the number listed below as soon as you receive this letter to discuss this issue and to make the necessary changes in our system to your contact information. Thank you for your cooperation. Sincerely,    Gabriela Castaneda.  [facility]. [address]   [phone]        Document electronically generated by:  Suleman Cabrera RN

## (undated) NOTE — MR AVS SNAPSHOT
[address]  [address] [facility]  [phone]               Thank you for choosing us for your health care visit with Arlene Andersen.  MD Dorothy.  We are glad to serve you and happy to provide you with this summary These medications were sent to [address] [place] [address] - [address] [facility], [phone], [address] [place] [id number]    Hours:  24-hours Phone:  [phone]    - amoxicillin 875 MG Tabs  - Flutic

## (undated) NOTE — LETTER
Date & Time: 11/5/2020, 1:39 PM  Patient: Nancy Garcia  Encounter Provider(s):    Yazmin Packer MD       To Whom It May Concern:    Nancy Garcia was seen and treated in our department on 11/5/2020. She may return to work on Monday November 9 2020.      If

## (undated) NOTE — MR AVS SNAPSHOT
[address]  [address] [facility]  [phone]               Thank you for choosing us for your health care visit with Nina Bain DO.   We are glad to serve you and happy to provide you with this summary Commonly known as:  MARI BOYD                Where to Get Your Medications      These medications were sent to [address] [place] [address], [phone], [address] [facility] [address] 2 ½ hours per week – spread out over time Use a kervin to keep you motivated   Don’t forget strength training with weights and resistance Set goals and track your progress   You don’t need to join a gym. Home exercises work great.  Put more priority on exe

## (undated) NOTE — Clinical Note
March 27, 2017     Elliott Seht      Dear Kennedy Diaz:    Below are the results from your recent visit: Nasal swab was completely negative.  Hopefully you are feeling better.  If you are you can really stop the Tamiflu as

## (undated) NOTE — LETTER
February 13, 2018     [address]      Dear Vernon Marquez:    Below are the results from your recent visit:    Pap is normal, and HPV is negative.    Recommend repeating pap in 3 years but still need an annual physi 02/07/2018 09:57 AM                                 Matthew Maldonado                                                              First Screen:          Fidelia Soto                                                      Rescreen:              Sole

## (undated) NOTE — LETTER
3/12/2020              [address] [address]        [address] [facility] Louisa Bergman should be off work on 3/12and 3/13 due to acute illness      Sincerely,    Paco Marks MD  [address] [place] [address]

## (undated) NOTE — LETTER
February 19, 2018     [address]      Dear Johnathon Blanks:    Below are the results from your recent visit:  Your mammogram showed benign findings (no areas of concern).  Please con't to do monthly self breast exams ROUTINE SCREENING MAMMOGRAM AND CLINICAL EVALUATION. PLEASE NOTE: NORMAL MAMMOGRAM DOES NOT EXCLUDE THE POSSIBILITY OF BREAST CANCER. A CLINICALLY SUSPICIOUS PALPABLE LUMP SHOULD BE BIOPSIED.        For patients over the age of 36, the targe

## (undated) NOTE — MR AVS SNAPSHOT
[address]  [address] [facility]  [phone]               Thank you for choosing us for your health care visit with Blue Hurst DO.   We are glad to serve you and happy to provide you with this summary Commonly known as:  OPTIVAR           Cyclobenzaprine HCl 10 MG Tabs   Take 1 tablet (10 mg total) by mouth nightly. As needed for muscle relaxation. Can make tired.    Commonly known as:  cyclobenzaprine           Diclofenac Sodium 75 MG Tbec   Take 1 tabl

## (undated) NOTE — LETTER
6/17/2019              [address]        [facility] [address]         Dear [facility],    Our records indicate that the pelvic ultrasound test ordered for you by Reece Lopez MD  has not been done.   If you have, in fact,

## (undated) NOTE — LETTER
04/12/19        [address]      Dear Aneudy Ellington records indicate that you have outstanding lab work and or testing that was ordered for you and has not yet been completed:  Orders Placed This Encounter

## (undated) NOTE — LETTER
[address] [place] [address] Rd, LILLIE Baker       INFORMED REFUSAL FOR TREATMENT / PROCEDURE / TESTING      I have been advised by  ______________________________ that it is necessary for me to undergo the following treatment, procedu

## (undated) NOTE — LETTER
10/24/2017          To Whom It May Concern:    Karena Rojas is currently under my medical care and may not return to work at this time. Please excuse Pina for 5 days. She may return to work on 10/30/17. Activity is restricted as follows: none.     If yo

## (undated) NOTE — LETTER
December 20, 2019     [address] Simpson  [address] [facility] [address]      Dear James Alfredo:    Below are the results from your recent visit:    Resulted Orders   CHLAMYDIA/GONOCOCCUS, YESSY   Result Value Ref Range    Chlamydia Trachomatis Amplified First Screen:          Rafael Monzon                                                               Specimen:     ThinPrep Imager Screening Pap, Cervical/endocervical/vaginal                              HPV REFLEX PAP NEG W/ GENOTYPE   Result Value Ref Ran

## (undated) NOTE — Clinical Note
3/14/2017              [address]        [facility] [address]         To whom it may concern,    Karena Rojas is currently a patient under my medical care.   Patient was seen today for illness and will have to be out of work today a

## (undated) NOTE — LETTER
[address] [facility]  [address], [address] Shravan Jacksoney [phone]        Dear Yany Suarez. MD Dorothy,      I had the pleasure of seeing your patient, Karena Rojas on 10/25/2017.      Below please find a summary has seen cardiologist for that. Patient started to develop also additionally mild headache on the left temple, would last for a few hours, happening couple times a week. No association with photophobia or phonophobia or nausea.   No focal weakness or numb Cv: pulses were palpable and normal, no cyanosis or edema     Neurological:     Mental Status- Alert and oriented x3.   Normal attention span and concentration  Thought process intact  Memory intact- recent and remote  Mood intact  Fund of knowledge appropr CO2 30 10/06/2017      I have reviewed labs and records from the primary care physician. Assessment   1. Syncope, unspecified syncope type  Syncope versus presyncope, most likely dehydration with anxiety.   However there could be removed consideration